# Patient Record
Sex: FEMALE | Race: WHITE | URBAN - METROPOLITAN AREA
[De-identification: names, ages, dates, MRNs, and addresses within clinical notes are randomized per-mention and may not be internally consistent; named-entity substitution may affect disease eponyms.]

---

## 2017-01-01 ENCOUNTER — HOSPITAL ENCOUNTER (OUTPATIENT)
Dept: SPEECH THERAPY | Facility: CLINIC | Age: 82
Setting detail: THERAPIES SERIES
End: 2017-03-31
Attending: INTERNAL MEDICINE
Payer: MEDICARE

## 2017-01-01 ENCOUNTER — OFFICE VISIT (OUTPATIENT)
Dept: INTERNAL MEDICINE | Facility: CLINIC | Age: 82
End: 2017-01-01
Payer: MEDICARE

## 2017-01-01 ENCOUNTER — MEDICAL CORRESPONDENCE (OUTPATIENT)
Dept: HEALTH INFORMATION MANAGEMENT | Facility: CLINIC | Age: 82
End: 2017-01-01

## 2017-01-01 ENCOUNTER — APPOINTMENT (OUTPATIENT)
Dept: GENERAL RADIOLOGY | Facility: CLINIC | Age: 82
DRG: 177 | End: 2017-01-01
Attending: EMERGENCY MEDICINE
Payer: MEDICARE

## 2017-01-01 ENCOUNTER — TELEPHONE (OUTPATIENT)
Dept: INTERNAL MEDICINE | Facility: CLINIC | Age: 82
End: 2017-01-01

## 2017-01-01 ENCOUNTER — RADIANT APPOINTMENT (OUTPATIENT)
Dept: GENERAL RADIOLOGY | Facility: CLINIC | Age: 82
End: 2017-01-01
Attending: INTERNAL MEDICINE
Payer: MEDICARE

## 2017-01-01 ENCOUNTER — APPOINTMENT (OUTPATIENT)
Dept: SPEECH THERAPY | Facility: CLINIC | Age: 82
DRG: 177 | End: 2017-01-01
Payer: MEDICARE

## 2017-01-01 ENCOUNTER — APPOINTMENT (OUTPATIENT)
Dept: PHYSICAL THERAPY | Facility: CLINIC | Age: 82
DRG: 177 | End: 2017-01-01
Payer: MEDICARE

## 2017-01-01 ENCOUNTER — TELEPHONE (OUTPATIENT)
Dept: NURSING | Facility: CLINIC | Age: 82
End: 2017-01-01

## 2017-01-01 ENCOUNTER — APPOINTMENT (OUTPATIENT)
Dept: OCCUPATIONAL THERAPY | Facility: CLINIC | Age: 82
DRG: 177 | End: 2017-01-01
Payer: MEDICARE

## 2017-01-01 ENCOUNTER — RADIANT APPOINTMENT (OUTPATIENT)
Dept: CARDIOLOGY | Facility: CLINIC | Age: 82
End: 2017-01-01
Attending: INTERNAL MEDICINE
Payer: MEDICARE

## 2017-01-01 ENCOUNTER — APPOINTMENT (OUTPATIENT)
Dept: OCCUPATIONAL THERAPY | Facility: CLINIC | Age: 82
DRG: 177 | End: 2017-01-01
Attending: INTERNAL MEDICINE
Payer: MEDICARE

## 2017-01-01 ENCOUNTER — HOSPITAL ENCOUNTER (OUTPATIENT)
Dept: GENERAL RADIOLOGY | Facility: CLINIC | Age: 82
Discharge: HOME | End: 2017-02-28
Attending: INTERNAL MEDICINE | Admitting: INTERNAL MEDICINE
Payer: MEDICARE

## 2017-01-01 ENCOUNTER — OFFICE VISIT (OUTPATIENT)
Dept: URGENT CARE | Facility: URGENT CARE | Age: 82
End: 2017-01-01
Payer: MEDICARE

## 2017-01-01 ENCOUNTER — HOSPITAL ENCOUNTER (INPATIENT)
Facility: CLINIC | Age: 82
LOS: 4 days | Discharge: HOSPICE | DRG: 177 | End: 2017-09-16
Attending: EMERGENCY MEDICINE | Admitting: INTERNAL MEDICINE
Payer: MEDICARE

## 2017-01-01 ENCOUNTER — TRANSFERRED RECORDS (OUTPATIENT)
Dept: HEALTH INFORMATION MANAGEMENT | Facility: CLINIC | Age: 82
End: 2017-01-01

## 2017-01-01 ENCOUNTER — APPOINTMENT (OUTPATIENT)
Dept: PHYSICAL THERAPY | Facility: CLINIC | Age: 82
DRG: 177 | End: 2017-01-01
Attending: INTERNAL MEDICINE
Payer: MEDICARE

## 2017-01-01 ENCOUNTER — HOSPITAL ENCOUNTER (OUTPATIENT)
Dept: SPEECH THERAPY | Facility: CLINIC | Age: 82
Setting detail: THERAPIES SERIES
End: 2017-03-14
Attending: INTERNAL MEDICINE
Payer: MEDICARE

## 2017-01-01 ENCOUNTER — HOSPITAL ENCOUNTER (OUTPATIENT)
Dept: SPEECH THERAPY | Facility: CLINIC | Age: 82
Setting detail: THERAPIES SERIES
End: 2017-02-09
Attending: INTERNAL MEDICINE
Payer: MEDICARE

## 2017-01-01 ENCOUNTER — HOSPITAL ENCOUNTER (OUTPATIENT)
Dept: SPEECH THERAPY | Facility: CLINIC | Age: 82
Setting detail: THERAPIES SERIES
End: 2017-02-28
Attending: INTERNAL MEDICINE
Payer: MEDICARE

## 2017-01-01 ENCOUNTER — NURSE TRIAGE (OUTPATIENT)
Dept: NURSING | Facility: CLINIC | Age: 82
End: 2017-01-01

## 2017-01-01 ENCOUNTER — APPOINTMENT (OUTPATIENT)
Dept: SPEECH THERAPY | Facility: CLINIC | Age: 82
DRG: 177 | End: 2017-01-01
Attending: INTERNAL MEDICINE
Payer: MEDICARE

## 2017-01-01 VITALS
HEART RATE: 71 BPM | DIASTOLIC BLOOD PRESSURE: 71 MMHG | OXYGEN SATURATION: 92 % | SYSTOLIC BLOOD PRESSURE: 169 MMHG | RESPIRATION RATE: 18 BRPM | BODY MASS INDEX: 32.64 KG/M2 | TEMPERATURE: 98 F | HEIGHT: 60 IN | WEIGHT: 166.23 LBS

## 2017-01-01 VITALS
DIASTOLIC BLOOD PRESSURE: 64 MMHG | WEIGHT: 168 LBS | TEMPERATURE: 98.3 F | OXYGEN SATURATION: 86 % | HEART RATE: 65 BPM | SYSTOLIC BLOOD PRESSURE: 132 MMHG | BODY MASS INDEX: 29.76 KG/M2

## 2017-01-01 VITALS
BODY MASS INDEX: 29.41 KG/M2 | WEIGHT: 166 LBS | HEIGHT: 63 IN | OXYGEN SATURATION: 94 % | DIASTOLIC BLOOD PRESSURE: 72 MMHG | TEMPERATURE: 98.7 F | SYSTOLIC BLOOD PRESSURE: 176 MMHG | HEART RATE: 74 BPM

## 2017-01-01 VITALS
SYSTOLIC BLOOD PRESSURE: 146 MMHG | TEMPERATURE: 97.5 F | HEIGHT: 63 IN | HEART RATE: 76 BPM | WEIGHT: 171 LBS | OXYGEN SATURATION: 95 % | BODY MASS INDEX: 30.3 KG/M2 | DIASTOLIC BLOOD PRESSURE: 72 MMHG | RESPIRATION RATE: 15 BRPM

## 2017-01-01 VITALS
DIASTOLIC BLOOD PRESSURE: 63 MMHG | SYSTOLIC BLOOD PRESSURE: 162 MMHG | HEART RATE: 68 BPM | BODY MASS INDEX: 29.05 KG/M2 | TEMPERATURE: 97 F | WEIGHT: 164 LBS

## 2017-01-01 VITALS
HEIGHT: 63 IN | OXYGEN SATURATION: 95 % | TEMPERATURE: 97.6 F | WEIGHT: 179 LBS | BODY MASS INDEX: 31.71 KG/M2 | DIASTOLIC BLOOD PRESSURE: 58 MMHG | SYSTOLIC BLOOD PRESSURE: 158 MMHG | HEART RATE: 58 BPM

## 2017-01-01 VITALS
HEART RATE: 58 BPM | SYSTOLIC BLOOD PRESSURE: 138 MMHG | BODY MASS INDEX: 29.76 KG/M2 | DIASTOLIC BLOOD PRESSURE: 90 MMHG | TEMPERATURE: 98.4 F | WEIGHT: 168 LBS | OXYGEN SATURATION: 91 %

## 2017-01-01 VITALS
TEMPERATURE: 97.7 F | BODY MASS INDEX: 29.51 KG/M2 | DIASTOLIC BLOOD PRESSURE: 70 MMHG | SYSTOLIC BLOOD PRESSURE: 156 MMHG | WEIGHT: 166.6 LBS | HEART RATE: 71 BPM | OXYGEN SATURATION: 93 %

## 2017-01-01 DIAGNOSIS — F41.9 ANXIETY: ICD-10-CM

## 2017-01-01 DIAGNOSIS — R06.00 DYSPNEA, UNSPECIFIED TYPE: ICD-10-CM

## 2017-01-01 DIAGNOSIS — J18.9 PNEUMONIA DUE TO INFECTIOUS ORGANISM, UNSPECIFIED LATERALITY, UNSPECIFIED PART OF LUNG: ICD-10-CM

## 2017-01-01 DIAGNOSIS — J18.9 PNEUMONIA OF RIGHT LOWER LOBE DUE TO INFECTIOUS ORGANISM: ICD-10-CM

## 2017-01-01 DIAGNOSIS — R53.83 FATIGUE, UNSPECIFIED TYPE: ICD-10-CM

## 2017-01-01 DIAGNOSIS — I10 ESSENTIAL HYPERTENSION, BENIGN: ICD-10-CM

## 2017-01-01 DIAGNOSIS — R05.9 COUGH: Primary | ICD-10-CM

## 2017-01-01 DIAGNOSIS — R05.9 COUGH: ICD-10-CM

## 2017-01-01 DIAGNOSIS — K92.2 GASTROINTESTINAL HEMORRHAGE, UNSPECIFIED GASTROINTESTINAL HEMORRHAGE TYPE: ICD-10-CM

## 2017-01-01 DIAGNOSIS — N89.8 VAGINAL ITCHING: Primary | ICD-10-CM

## 2017-01-01 DIAGNOSIS — R13.10 DYSPHAGIA, UNSPECIFIED TYPE: Primary | ICD-10-CM

## 2017-01-01 DIAGNOSIS — E11.40 TYPE 2 DIABETES MELLITUS WITH DIABETIC NEUROPATHY, WITHOUT LONG-TERM CURRENT USE OF INSULIN (H): ICD-10-CM

## 2017-01-01 DIAGNOSIS — R39.9 SYMPTOMS INVOLVING URINARY SYSTEM: Primary | ICD-10-CM

## 2017-01-01 DIAGNOSIS — Z51.5 HOSPICE CARE PATIENT: ICD-10-CM

## 2017-01-01 DIAGNOSIS — T78.40XA ALLERGIC REACTION, INITIAL ENCOUNTER: ICD-10-CM

## 2017-01-01 DIAGNOSIS — D47.2 MGUS (MONOCLONAL GAMMOPATHY OF UNKNOWN SIGNIFICANCE): ICD-10-CM

## 2017-01-01 DIAGNOSIS — J18.9 PNEUMONIA OF RIGHT LOWER LOBE DUE TO INFECTIOUS ORGANISM: Primary | ICD-10-CM

## 2017-01-01 DIAGNOSIS — D64.9 ANEMIA, UNSPECIFIED TYPE: ICD-10-CM

## 2017-01-01 DIAGNOSIS — E87.6 HYPOPOTASSEMIA: Primary | ICD-10-CM

## 2017-01-01 DIAGNOSIS — J18.9 PNEUMONIA OF RIGHT LUNG DUE TO INFECTIOUS ORGANISM, UNSPECIFIED PART OF LUNG: Primary | ICD-10-CM

## 2017-01-01 DIAGNOSIS — R09.02 HYPOXIA: ICD-10-CM

## 2017-01-01 DIAGNOSIS — J18.9 PNEUMONIA OF RIGHT LUNG DUE TO INFECTIOUS ORGANISM, UNSPECIFIED PART OF LUNG: ICD-10-CM

## 2017-01-01 DIAGNOSIS — R30.0 DYSURIA: Primary | ICD-10-CM

## 2017-01-01 DIAGNOSIS — R31.9 HEMATURIA: ICD-10-CM

## 2017-01-01 DIAGNOSIS — G62.9 NEUROPATHY: ICD-10-CM

## 2017-01-01 DIAGNOSIS — R39.9 SYMPTOMS INVOLVING URINARY SYSTEM: ICD-10-CM

## 2017-01-01 DIAGNOSIS — R60.9 EDEMA, UNSPECIFIED TYPE: ICD-10-CM

## 2017-01-01 DIAGNOSIS — N39.0 URINARY TRACT INFECTION, SITE UNSPECIFIED: ICD-10-CM

## 2017-01-01 DIAGNOSIS — R82.90 NONSPECIFIC FINDING ON EXAMINATION OF URINE: Primary | ICD-10-CM

## 2017-01-01 DIAGNOSIS — J18.9 PNEUMONIA DUE TO INFECTIOUS ORGANISM, UNSPECIFIED LATERALITY, UNSPECIFIED PART OF LUNG: Primary | ICD-10-CM

## 2017-01-01 DIAGNOSIS — G25.81 RESTLESS LEGS SYNDROME: ICD-10-CM

## 2017-01-01 DIAGNOSIS — H61.23 BILATERAL IMPACTED CERUMEN: Primary | ICD-10-CM

## 2017-01-01 DIAGNOSIS — L30.4 INTERTRIGO: ICD-10-CM

## 2017-01-01 DIAGNOSIS — R13.10 DYSPHAGIA, UNSPECIFIED TYPE: ICD-10-CM

## 2017-01-01 DIAGNOSIS — M17.12 PRIMARY LOCALIZED OSTEOARTHROSIS, LOWER LEG, LEFT: ICD-10-CM

## 2017-01-01 DIAGNOSIS — B37.2 CANDIDAL INTERTRIGO: Primary | ICD-10-CM

## 2017-01-01 DIAGNOSIS — I48.20 CHRONIC ATRIAL FIBRILLATION (H): ICD-10-CM

## 2017-01-01 LAB
ABO + RH BLD: NORMAL
ABO + RH BLD: NORMAL
ALBUMIN SERPL-MCNC: 2.8 G/DL (ref 3.4–5)
ALBUMIN UR-MCNC: 100 MG/DL
ALBUMIN UR-MCNC: 30 MG/DL
ALBUMIN UR-MCNC: ABNORMAL MG/DL
ALP SERPL-CCNC: 85 U/L (ref 40–150)
ALT SERPL W P-5'-P-CCNC: 19 U/L (ref 0–50)
ANION GAP SERPL CALCULATED.3IONS-SCNC: 10 MMOL/L (ref 3–14)
ANION GAP SERPL CALCULATED.3IONS-SCNC: 10 MMOL/L (ref 3–14)
ANION GAP SERPL CALCULATED.3IONS-SCNC: 7 MMOL/L (ref 3–14)
ANION GAP SERPL CALCULATED.3IONS-SCNC: 8 MMOL/L (ref 3–14)
ANION GAP SERPL CALCULATED.3IONS-SCNC: 9 MMOL/L (ref 3–14)
ANION GAP SERPL CALCULATED.3IONS-SCNC: 9 MMOL/L (ref 3–14)
APPEARANCE UR: ABNORMAL
APPEARANCE UR: ABNORMAL
APPEARANCE UR: CLEAR
AST SERPL W P-5'-P-CCNC: 12 U/L (ref 0–45)
BACTERIA #/AREA URNS HPF: ABNORMAL /HPF
BACTERIA SPEC CULT: ABNORMAL
BACTERIA SPEC CULT: NO GROWTH
BACTERIA SPEC CULT: NO GROWTH
BACTERIA SPEC CULT: NORMAL
BACTERIA SPEC CULT: NORMAL
BASOPHILS # BLD AUTO: 0.1 10E9/L (ref 0–0.2)
BASOPHILS NFR BLD AUTO: 0.5 %
BILIRUB SERPL-MCNC: 0.3 MG/DL (ref 0.2–1.3)
BILIRUB UR QL STRIP: ABNORMAL
BILIRUB UR QL STRIP: NEGATIVE
BILIRUB UR QL STRIP: NEGATIVE
BLD GP AB SCN SERPL QL: NORMAL
BLOOD BANK CMNT PATIENT-IMP: NORMAL
BUN SERPL-MCNC: 16 MG/DL (ref 7–30)
BUN SERPL-MCNC: 17 MG/DL (ref 7–30)
BUN SERPL-MCNC: 17 MG/DL (ref 7–30)
BUN SERPL-MCNC: 19 MG/DL (ref 7–30)
BUN SERPL-MCNC: 19 MG/DL (ref 7–30)
BUN SERPL-MCNC: 23 MG/DL (ref 7–30)
CALCIUM SERPL-MCNC: 8.1 MG/DL (ref 8.5–10.1)
CALCIUM SERPL-MCNC: 8.4 MG/DL (ref 8.5–10.1)
CALCIUM SERPL-MCNC: 8.5 MG/DL (ref 8.5–10.1)
CALCIUM SERPL-MCNC: 8.6 MG/DL (ref 8.5–10.1)
CALCIUM SERPL-MCNC: 8.6 MG/DL (ref 8.5–10.1)
CALCIUM SERPL-MCNC: 9.2 MG/DL (ref 8.5–10.1)
CHLORIDE SERPL-SCNC: 100 MMOL/L (ref 94–109)
CHLORIDE SERPL-SCNC: 101 MMOL/L (ref 94–109)
CHLORIDE SERPL-SCNC: 102 MMOL/L (ref 94–109)
CHLORIDE SERPL-SCNC: 103 MMOL/L (ref 94–109)
CHLORIDE SERPL-SCNC: 105 MMOL/L (ref 94–109)
CHLORIDE SERPL-SCNC: 99 MMOL/L (ref 94–109)
CO2 SERPL-SCNC: 23 MMOL/L (ref 20–32)
CO2 SERPL-SCNC: 26 MMOL/L (ref 20–32)
CO2 SERPL-SCNC: 26 MMOL/L (ref 20–32)
CO2 SERPL-SCNC: 27 MMOL/L (ref 20–32)
COLOR UR AUTO: ABNORMAL
COLOR UR AUTO: YELLOW
COLOR UR AUTO: YELLOW
CREAT SERPL-MCNC: 1.04 MG/DL (ref 0.52–1.04)
CREAT SERPL-MCNC: 1.08 MG/DL (ref 0.52–1.04)
CREAT SERPL-MCNC: 1.13 MG/DL (ref 0.52–1.04)
CREAT SERPL-MCNC: 1.16 MG/DL (ref 0.52–1.04)
CREAT SERPL-MCNC: 1.17 MG/DL (ref 0.52–1.04)
CREAT SERPL-MCNC: 1.2 MG/DL (ref 0.52–1.04)
DIFFERENTIAL METHOD BLD: ABNORMAL
DIGOXIN SERPL-MCNC: 0.9 UG/L (ref 0.5–2)
DIGOXIN SERPL-MCNC: 1.5 UG/L (ref 0.5–2)
EOSINOPHIL # BLD AUTO: 0.1 10E9/L (ref 0–0.7)
EOSINOPHIL NFR BLD AUTO: 1 %
ERYTHROCYTE [DISTWIDTH] IN BLOOD BY AUTOMATED COUNT: 14.4 % (ref 10–15)
ERYTHROCYTE [DISTWIDTH] IN BLOOD BY AUTOMATED COUNT: 14.6 % (ref 10–15)
ERYTHROCYTE [DISTWIDTH] IN BLOOD BY AUTOMATED COUNT: 14.6 % (ref 10–15)
ERYTHROCYTE [DISTWIDTH] IN BLOOD BY AUTOMATED COUNT: 14.8 % (ref 10–15)
ERYTHROCYTE [DISTWIDTH] IN BLOOD BY AUTOMATED COUNT: 15 % (ref 10–15)
ERYTHROCYTE [DISTWIDTH] IN BLOOD BY AUTOMATED COUNT: 15.1 % (ref 10–15)
ERYTHROCYTE [DISTWIDTH] IN BLOOD BY AUTOMATED COUNT: 15.2 % (ref 10–15)
FERRITIN SERPL-MCNC: 10 NG/ML (ref 8–252)
FOLATE SERPL-MCNC: 35.1 NG/ML
FOLATE SERPL-MCNC: NORMAL NG/ML
GFR SERPL CREATININE-BSD FRML MDRD: 42 ML/MIN/1.7M2
GFR SERPL CREATININE-BSD FRML MDRD: 43 ML/MIN/1.7M2
GFR SERPL CREATININE-BSD FRML MDRD: 43 ML/MIN/1.7M2
GFR SERPL CREATININE-BSD FRML MDRD: 45 ML/MIN/1.7M2
GFR SERPL CREATININE-BSD FRML MDRD: 47 ML/MIN/1.7M2
GFR SERPL CREATININE-BSD FRML MDRD: 49 ML/MIN/1.7M2
GLUCOSE BLDC GLUCOMTR-MCNC: 103 MG/DL (ref 70–99)
GLUCOSE BLDC GLUCOMTR-MCNC: 127 MG/DL (ref 70–99)
GLUCOSE BLDC GLUCOMTR-MCNC: 128 MG/DL (ref 70–99)
GLUCOSE BLDC GLUCOMTR-MCNC: 132 MG/DL (ref 70–99)
GLUCOSE BLDC GLUCOMTR-MCNC: 138 MG/DL (ref 70–99)
GLUCOSE BLDC GLUCOMTR-MCNC: 142 MG/DL (ref 70–99)
GLUCOSE BLDC GLUCOMTR-MCNC: 148 MG/DL (ref 70–99)
GLUCOSE BLDC GLUCOMTR-MCNC: 148 MG/DL (ref 70–99)
GLUCOSE BLDC GLUCOMTR-MCNC: 153 MG/DL (ref 70–99)
GLUCOSE BLDC GLUCOMTR-MCNC: 153 MG/DL (ref 70–99)
GLUCOSE BLDC GLUCOMTR-MCNC: 166 MG/DL (ref 70–99)
GLUCOSE BLDC GLUCOMTR-MCNC: 167 MG/DL (ref 70–99)
GLUCOSE BLDC GLUCOMTR-MCNC: 169 MG/DL (ref 70–99)
GLUCOSE BLDC GLUCOMTR-MCNC: 173 MG/DL (ref 70–99)
GLUCOSE BLDC GLUCOMTR-MCNC: 175 MG/DL (ref 70–99)
GLUCOSE BLDC GLUCOMTR-MCNC: 200 MG/DL (ref 70–99)
GLUCOSE BLDC GLUCOMTR-MCNC: 222 MG/DL (ref 70–99)
GLUCOSE BLDC GLUCOMTR-MCNC: 228 MG/DL (ref 70–99)
GLUCOSE BLDC GLUCOMTR-MCNC: 228 MG/DL (ref 70–99)
GLUCOSE SERPL-MCNC: 153 MG/DL (ref 70–99)
GLUCOSE SERPL-MCNC: 161 MG/DL (ref 70–99)
GLUCOSE SERPL-MCNC: 210 MG/DL (ref 70–99)
GLUCOSE SERPL-MCNC: 212 MG/DL (ref 70–99)
GLUCOSE SERPL-MCNC: 240 MG/DL (ref 70–99)
GLUCOSE SERPL-MCNC: 327 MG/DL (ref 70–99)
GLUCOSE UR STRIP-MCNC: 100 MG/DL
GLUCOSE UR STRIP-MCNC: >=1000 MG/DL
GLUCOSE UR STRIP-MCNC: NEGATIVE MG/DL
HBA1C MFR BLD: 9.2 % (ref 4.3–6)
HBA1C MFR BLD: 9.4 % (ref 4.3–6)
HBA1C MFR BLD: 9.5 % (ref 4.3–6)
HCT VFR BLD AUTO: 24.6 % (ref 35–47)
HCT VFR BLD AUTO: 25.2 % (ref 35–47)
HCT VFR BLD AUTO: 25.2 % (ref 35–47)
HCT VFR BLD AUTO: 26.7 % (ref 35–47)
HCT VFR BLD AUTO: 28.8 % (ref 35–47)
HCT VFR BLD AUTO: 35 % (ref 35–47)
HCT VFR BLD AUTO: 36.1 % (ref 35–47)
HGB BLD-MCNC: 10.7 G/DL (ref 11.7–15.7)
HGB BLD-MCNC: 11.1 G/DL (ref 11.7–15.7)
HGB BLD-MCNC: 7.3 G/DL (ref 11.7–15.7)
HGB BLD-MCNC: 7.6 G/DL (ref 11.7–15.7)
HGB BLD-MCNC: 7.6 G/DL (ref 11.7–15.7)
HGB BLD-MCNC: 8.2 G/DL (ref 11.7–15.7)
HGB BLD-MCNC: 8.4 G/DL (ref 11.7–15.7)
HGB UR QL STRIP: ABNORMAL
IMM GRANULOCYTES # BLD: 0.1 10E9/L (ref 0–0.4)
IMM GRANULOCYTES NFR BLD: 0.6 %
INR PPP: 1.37 (ref 0.86–1.14)
INTERPRETATION ECG - MUSE: NORMAL
IRON SATN MFR SERPL: 3 % (ref 15–46)
IRON SERPL-MCNC: 12 UG/DL (ref 35–180)
KETONES UR STRIP-MCNC: NEGATIVE MG/DL
LACTATE BLD-SCNC: 1.7 MMOL/L (ref 0.7–2)
LACTATE BLD-SCNC: 2.2 MMOL/L (ref 0.7–2)
LEUKOCYTE ESTERASE UR QL STRIP: ABNORMAL
LEUKOCYTE ESTERASE UR QL STRIP: ABNORMAL
LEUKOCYTE ESTERASE UR QL STRIP: NEGATIVE
LYMPHOCYTES # BLD AUTO: 1.4 10E9/L (ref 0.8–5.3)
LYMPHOCYTES NFR BLD AUTO: 12.1 %
Lab: NORMAL
Lab: NORMAL
MCH RBC QN AUTO: 23.3 PG (ref 26.5–33)
MCH RBC QN AUTO: 23.6 PG (ref 26.5–33)
MCH RBC QN AUTO: 23.8 PG (ref 26.5–33)
MCH RBC QN AUTO: 24 PG (ref 26.5–33)
MCH RBC QN AUTO: 24.3 PG (ref 26.5–33)
MCH RBC QN AUTO: 24.8 PG (ref 26.5–33)
MCH RBC QN AUTO: 24.9 PG (ref 26.5–33)
MCHC RBC AUTO-ENTMCNC: 29.2 G/DL (ref 31.5–36.5)
MCHC RBC AUTO-ENTMCNC: 29.7 G/DL (ref 31.5–36.5)
MCHC RBC AUTO-ENTMCNC: 30.2 G/DL (ref 31.5–36.5)
MCHC RBC AUTO-ENTMCNC: 30.2 G/DL (ref 31.5–36.5)
MCHC RBC AUTO-ENTMCNC: 30.6 G/DL (ref 31.5–36.5)
MCHC RBC AUTO-ENTMCNC: 30.7 G/DL (ref 31.5–36.5)
MCHC RBC AUTO-ENTMCNC: 30.7 G/DL (ref 31.5–36.5)
MCV RBC AUTO: 78 FL (ref 78–100)
MCV RBC AUTO: 78 FL (ref 78–100)
MCV RBC AUTO: 79 FL (ref 78–100)
MCV RBC AUTO: 79 FL (ref 78–100)
MCV RBC AUTO: 81 FL (ref 78–100)
MCV RBC AUTO: 82 FL (ref 78–100)
MCV RBC AUTO: 83 FL (ref 78–100)
MICRO REPORT STATUS: ABNORMAL
MICRO REPORT STATUS: NORMAL
MICRO REPORT STATUS: NORMAL
MICROORGANISM SPEC CULT: ABNORMAL
MONOCYTES # BLD AUTO: 1.2 10E9/L (ref 0–1.3)
MONOCYTES NFR BLD AUTO: 10.7 %
NEUTROPHILS # BLD AUTO: 8.7 10E9/L (ref 1.6–8.3)
NEUTROPHILS NFR BLD AUTO: 75.1 %
NITRATE UR QL: NEGATIVE
NITRATE UR QL: NEGATIVE
NITRATE UR QL: POSITIVE
NON-SQ EPI CELLS #/AREA URNS LPF: ABNORMAL /LPF
NRBC # BLD AUTO: 0 10*3/UL
NRBC BLD AUTO-RTO: 0 /100
NT-PROBNP SERPL-MCNC: 1781 PG/ML (ref 0–1800)
PH UR STRIP: 5.5 PH (ref 5–7)
PLATELET # BLD AUTO: 384 10E9/L (ref 150–450)
PLATELET # BLD AUTO: 385 10E9/L (ref 150–450)
PLATELET # BLD AUTO: 386 10E9/L (ref 150–450)
PLATELET # BLD AUTO: 388 10E9/L (ref 150–450)
PLATELET # BLD AUTO: 396 10E9/L (ref 150–450)
PLATELET # BLD AUTO: 405 10E9/L (ref 150–450)
PLATELET # BLD AUTO: 462 10E9/L (ref 150–450)
POTASSIUM SERPL-SCNC: 4.1 MMOL/L (ref 3.4–5.3)
POTASSIUM SERPL-SCNC: 4.1 MMOL/L (ref 3.4–5.3)
POTASSIUM SERPL-SCNC: 4.3 MMOL/L (ref 3.4–5.3)
POTASSIUM SERPL-SCNC: 4.3 MMOL/L (ref 3.4–5.3)
POTASSIUM SERPL-SCNC: 4.5 MMOL/L (ref 3.4–5.3)
POTASSIUM SERPL-SCNC: 5.3 MMOL/L (ref 3.4–5.3)
PROT SERPL-MCNC: 7.5 G/DL (ref 6.8–8.8)
RBC # BLD AUTO: 3.13 10E12/L (ref 3.8–5.2)
RBC # BLD AUTO: 3.19 10E12/L (ref 3.8–5.2)
RBC # BLD AUTO: 3.22 10E12/L (ref 3.8–5.2)
RBC # BLD AUTO: 3.42 10E12/L (ref 3.8–5.2)
RBC # BLD AUTO: 3.46 10E12/L (ref 3.8–5.2)
RBC # BLD AUTO: 4.29 10E12/L (ref 3.8–5.2)
RBC # BLD AUTO: 4.48 10E12/L (ref 3.8–5.2)
RBC #/AREA URNS AUTO: ABNORMAL /HPF
RBC #/AREA URNS AUTO: ABNORMAL /HPF (ref 0–2)
RBC #/AREA URNS AUTO: ABNORMAL /HPF (ref 0–2)
SODIUM SERPL-SCNC: 133 MMOL/L (ref 133–144)
SODIUM SERPL-SCNC: 134 MMOL/L (ref 133–144)
SODIUM SERPL-SCNC: 134 MMOL/L (ref 133–144)
SODIUM SERPL-SCNC: 135 MMOL/L (ref 133–144)
SODIUM SERPL-SCNC: 137 MMOL/L (ref 133–144)
SODIUM SERPL-SCNC: 138 MMOL/L (ref 133–144)
SOURCE: ABNORMAL
SP GR UR STRIP: 1.01 (ref 1–1.03)
SP GR UR STRIP: 1.02 (ref 1–1.03)
SP GR UR STRIP: <=1.005 (ref 1–1.03)
SPECIMEN EXP DATE BLD: NORMAL
SPECIMEN SOURCE: ABNORMAL
SPECIMEN SOURCE: NORMAL
TIBC SERPL-MCNC: 451 UG/DL (ref 240–430)
TROPONIN I SERPL-MCNC: <0.015 UG/L (ref 0–0.04)
URN SPEC COLLECT METH UR: ABNORMAL
URN SPEC COLLECT METH UR: ABNORMAL
UROBILINOGEN UR STRIP-ACNC: 0.2 EU/DL (ref 0.2–1)
VIT B12 SERPL-MCNC: 1077 PG/ML (ref 193–986)
WBC # BLD AUTO: 11.6 10E9/L (ref 4–11)
WBC # BLD AUTO: 11.7 10E9/L (ref 4–11)
WBC # BLD AUTO: 11.7 10E9/L (ref 4–11)
WBC # BLD AUTO: 11.9 10E9/L (ref 4–11)
WBC # BLD AUTO: 12.6 10E9/L (ref 4–11)
WBC # BLD AUTO: 14.9 10E9/L (ref 4–11)
WBC # BLD AUTO: 15.5 10E9/L (ref 4–11)
WBC #/AREA URNS AUTO: >100 /HPF
WBC #/AREA URNS AUTO: ABNORMAL /HPF (ref 0–2)
WBC #/AREA URNS AUTO: ABNORMAL /HPF (ref 0–2)
WET PREP SPEC: NORMAL

## 2017-01-01 PROCEDURE — 83605 ASSAY OF LACTIC ACID: CPT | Performed by: INTERNAL MEDICINE

## 2017-01-01 PROCEDURE — 25000128 H RX IP 250 OP 636: Performed by: EMERGENCY MEDICINE

## 2017-01-01 PROCEDURE — 12000000 ZZH R&B MED SURG/OB

## 2017-01-01 PROCEDURE — 25000132 ZZH RX MED GY IP 250 OP 250 PS 637: Mod: GY | Performed by: INTERNAL MEDICINE

## 2017-01-01 PROCEDURE — 80048 BASIC METABOLIC PNL TOTAL CA: CPT | Performed by: INTERNAL MEDICINE

## 2017-01-01 PROCEDURE — 85027 COMPLETE CBC AUTOMATED: CPT | Performed by: INTERNAL MEDICINE

## 2017-01-01 PROCEDURE — 82728 ASSAY OF FERRITIN: CPT | Performed by: EMERGENCY MEDICINE

## 2017-01-01 PROCEDURE — 92526 ORAL FUNCTION THERAPY: CPT | Mod: GN

## 2017-01-01 PROCEDURE — 83036 HEMOGLOBIN GLYCOSYLATED A1C: CPT | Performed by: INTERNAL MEDICINE

## 2017-01-01 PROCEDURE — 86901 BLOOD TYPING SEROLOGIC RH(D): CPT | Performed by: EMERGENCY MEDICINE

## 2017-01-01 PROCEDURE — 87186 SC STD MICRODIL/AGAR DIL: CPT | Performed by: INTERNAL MEDICINE

## 2017-01-01 PROCEDURE — 99223 1ST HOSP IP/OBS HIGH 75: CPT | Mod: AI | Performed by: INTERNAL MEDICINE

## 2017-01-01 PROCEDURE — 00000146 ZZHCL STATISTIC GLUCOSE BY METER IP

## 2017-01-01 PROCEDURE — 83550 IRON BINDING TEST: CPT | Performed by: EMERGENCY MEDICINE

## 2017-01-01 PROCEDURE — 80162 ASSAY OF DIGOXIN TOTAL: CPT | Performed by: INTERNAL MEDICINE

## 2017-01-01 PROCEDURE — 25000128 H RX IP 250 OP 636: Performed by: INTERNAL MEDICINE

## 2017-01-01 PROCEDURE — 40000211 ZZHC STATISTIC SLP  DEPARTMENT VISIT: Performed by: SPEECH-LANGUAGE PATHOLOGIST

## 2017-01-01 PROCEDURE — 36415 COLL VENOUS BLD VENIPUNCTURE: CPT | Performed by: INTERNAL MEDICINE

## 2017-01-01 PROCEDURE — 99233 SBSQ HOSP IP/OBS HIGH 50: CPT | Performed by: INTERNAL MEDICINE

## 2017-01-01 PROCEDURE — 96365 THER/PROPH/DIAG IV INF INIT: CPT

## 2017-01-01 PROCEDURE — 87088 URINE BACTERIA CULTURE: CPT | Performed by: INTERNAL MEDICINE

## 2017-01-01 PROCEDURE — 92526 ORAL FUNCTION THERAPY: CPT | Mod: GN | Performed by: SPEECH-LANGUAGE PATHOLOGIST

## 2017-01-01 PROCEDURE — 83036 HEMOGLOBIN GLYCOSYLATED A1C: CPT | Performed by: EMERGENCY MEDICINE

## 2017-01-01 PROCEDURE — 87040 BLOOD CULTURE FOR BACTERIA: CPT | Performed by: EMERGENCY MEDICINE

## 2017-01-01 PROCEDURE — 99214 OFFICE O/P EST MOD 30 MIN: CPT | Performed by: INTERNAL MEDICINE

## 2017-01-01 PROCEDURE — 93306 TTE W/DOPPLER COMPLETE: CPT | Mod: 26 | Performed by: INTERNAL MEDICINE

## 2017-01-01 PROCEDURE — 97535 SELF CARE MNGMENT TRAINING: CPT | Mod: GO

## 2017-01-01 PROCEDURE — 96375 TX/PRO/DX INJ NEW DRUG ADDON: CPT

## 2017-01-01 PROCEDURE — 92610 EVALUATE SWALLOWING FUNCTION: CPT | Mod: GN | Performed by: SPEECH-LANGUAGE PATHOLOGIST

## 2017-01-01 PROCEDURE — 25000131 ZZH RX MED GY IP 250 OP 636 PS 637: Mod: GY | Performed by: INTERNAL MEDICINE

## 2017-01-01 PROCEDURE — G8997 SWALLOW GOAL STATUS: HCPCS | Mod: GN,CJ | Performed by: SPEECH-LANGUAGE PATHOLOGIST

## 2017-01-01 PROCEDURE — 87086 URINE CULTURE/COLONY COUNT: CPT | Performed by: INTERNAL MEDICINE

## 2017-01-01 PROCEDURE — G8997 SWALLOW GOAL STATUS: HCPCS | Mod: GN,CI | Performed by: SPEECH-LANGUAGE PATHOLOGIST

## 2017-01-01 PROCEDURE — 92611 MOTION FLUOROSCOPY/SWALLOW: CPT | Mod: GN | Performed by: SPEECH-LANGUAGE PATHOLOGIST

## 2017-01-01 PROCEDURE — 86850 RBC ANTIBODY SCREEN: CPT | Performed by: EMERGENCY MEDICINE

## 2017-01-01 PROCEDURE — 40000193 ZZH STATISTIC PT WARD VISIT: Performed by: PHYSICAL THERAPIST

## 2017-01-01 PROCEDURE — 36415 COLL VENOUS BLD VENIPUNCTURE: CPT | Performed by: EMERGENCY MEDICINE

## 2017-01-01 PROCEDURE — 99285 EMERGENCY DEPT VISIT HI MDM: CPT | Mod: 25

## 2017-01-01 PROCEDURE — 71020 XR CHEST 2 VW: CPT

## 2017-01-01 PROCEDURE — 97165 OT EVAL LOW COMPLEX 30 MIN: CPT | Mod: GO

## 2017-01-01 PROCEDURE — 40000225 ZZH STATISTIC SLP WARD VISIT

## 2017-01-01 PROCEDURE — G8996 SWALLOW CURRENT STATUS: HCPCS | Mod: GN,CI | Performed by: SPEECH-LANGUAGE PATHOLOGIST

## 2017-01-01 PROCEDURE — 83540 ASSAY OF IRON: CPT | Performed by: EMERGENCY MEDICINE

## 2017-01-01 PROCEDURE — A9270 NON-COVERED ITEM OR SERVICE: HCPCS | Mod: GY | Performed by: INTERNAL MEDICINE

## 2017-01-01 PROCEDURE — 40000225 ZZH STATISTIC SLP WARD VISIT: Performed by: SPEECH-LANGUAGE PATHOLOGIST

## 2017-01-01 PROCEDURE — 93005 ELECTROCARDIOGRAM TRACING: CPT

## 2017-01-01 PROCEDURE — 97535 SELF CARE MNGMENT TRAINING: CPT | Mod: GO | Performed by: OCCUPATIONAL THERAPIST

## 2017-01-01 PROCEDURE — G8996 SWALLOW CURRENT STATUS: HCPCS | Mod: GN,CJ | Performed by: SPEECH-LANGUAGE PATHOLOGIST

## 2017-01-01 PROCEDURE — 99214 OFFICE O/P EST MOD 30 MIN: CPT | Performed by: PHYSICIAN ASSISTANT

## 2017-01-01 PROCEDURE — 83605 ASSAY OF LACTIC ACID: CPT | Performed by: EMERGENCY MEDICINE

## 2017-01-01 PROCEDURE — 82607 VITAMIN B-12: CPT | Performed by: EMERGENCY MEDICINE

## 2017-01-01 PROCEDURE — 97116 GAIT TRAINING THERAPY: CPT | Mod: GP | Performed by: PHYSICAL THERAPIST

## 2017-01-01 PROCEDURE — 97530 THERAPEUTIC ACTIVITIES: CPT | Mod: GP | Performed by: PHYSICAL THERAPIST

## 2017-01-01 PROCEDURE — 99213 OFFICE O/P EST LOW 20 MIN: CPT | Performed by: PHYSICIAN ASSISTANT

## 2017-01-01 PROCEDURE — 80162 ASSAY OF DIGOXIN TOTAL: CPT | Performed by: EMERGENCY MEDICINE

## 2017-01-01 PROCEDURE — 93306 TTE W/DOPPLER COMPLETE: CPT | Mod: TC

## 2017-01-01 PROCEDURE — 81001 URINALYSIS AUTO W/SCOPE: CPT | Performed by: PHYSICIAN ASSISTANT

## 2017-01-01 PROCEDURE — 99213 OFFICE O/P EST LOW 20 MIN: CPT | Performed by: FAMILY MEDICINE

## 2017-01-01 PROCEDURE — 85025 COMPLETE CBC W/AUTO DIFF WBC: CPT | Performed by: EMERGENCY MEDICINE

## 2017-01-01 PROCEDURE — 97110 THERAPEUTIC EXERCISES: CPT | Mod: GP | Performed by: PHYSICAL THERAPIST

## 2017-01-01 PROCEDURE — 99239 HOSP IP/OBS DSCHRG MGMT >30: CPT | Mod: GV | Performed by: INTERNAL MEDICINE

## 2017-01-01 PROCEDURE — 87210 SMEAR WET MOUNT SALINE/INK: CPT | Performed by: PHYSICIAN ASSISTANT

## 2017-01-01 PROCEDURE — 82746 ASSAY OF FOLIC ACID SERUM: CPT | Performed by: EMERGENCY MEDICINE

## 2017-01-01 PROCEDURE — 81001 URINALYSIS AUTO W/SCOPE: CPT | Performed by: INTERNAL MEDICINE

## 2017-01-01 PROCEDURE — 40000133 ZZH STATISTIC OT WARD VISIT: Performed by: OCCUPATIONAL THERAPIST

## 2017-01-01 PROCEDURE — 82746 ASSAY OF FOLIC ACID SERUM: CPT | Performed by: INTERNAL MEDICINE

## 2017-01-01 PROCEDURE — 40000133 ZZH STATISTIC OT WARD VISIT

## 2017-01-01 PROCEDURE — 80053 COMPREHEN METABOLIC PANEL: CPT | Performed by: EMERGENCY MEDICINE

## 2017-01-01 PROCEDURE — 97161 PT EVAL LOW COMPLEX 20 MIN: CPT | Mod: GP | Performed by: PHYSICAL THERAPIST

## 2017-01-01 PROCEDURE — 86900 BLOOD TYPING SEROLOGIC ABO: CPT | Performed by: EMERGENCY MEDICINE

## 2017-01-01 PROCEDURE — 87086 URINE CULTURE/COLONY COUNT: CPT | Performed by: PHYSICIAN ASSISTANT

## 2017-01-01 PROCEDURE — 96367 TX/PROPH/DG ADDL SEQ IV INF: CPT

## 2017-01-01 PROCEDURE — 25000125 ZZHC RX 250: Performed by: EMERGENCY MEDICINE

## 2017-01-01 PROCEDURE — 83880 ASSAY OF NATRIURETIC PEPTIDE: CPT | Performed by: EMERGENCY MEDICINE

## 2017-01-01 PROCEDURE — 92507 TX SP LANG VOICE COMM INDIV: CPT | Mod: GN | Performed by: SPEECH-LANGUAGE PATHOLOGIST

## 2017-01-01 PROCEDURE — 84484 ASSAY OF TROPONIN QUANT: CPT | Performed by: EMERGENCY MEDICINE

## 2017-01-01 PROCEDURE — 85610 PROTHROMBIN TIME: CPT | Performed by: EMERGENCY MEDICINE

## 2017-01-01 RX ORDER — SODIUM CHLORIDE 9 MG/ML
INJECTION, SOLUTION INTRAVENOUS CONTINUOUS
Status: ACTIVE | OUTPATIENT
Start: 2017-01-01 | End: 2017-01-01

## 2017-01-01 RX ORDER — ONDANSETRON 4 MG/1
4 TABLET, ORALLY DISINTEGRATING ORAL EVERY 6 HOURS PRN
Status: DISCONTINUED | OUTPATIENT
Start: 2017-01-01 | End: 2017-01-01 | Stop reason: HOSPADM

## 2017-01-01 RX ORDER — SENNOSIDES 8.6 MG
1-2 TABLET ORAL 2 TIMES DAILY
Qty: 100 TABLET | Refills: 1 | Status: SHIPPED | OUTPATIENT
Start: 2017-01-01

## 2017-01-01 RX ORDER — GABAPENTIN 300 MG/1
300 CAPSULE ORAL AT BEDTIME
Qty: 90 CAPSULE | Refills: 3 | Status: SHIPPED | OUTPATIENT
Start: 2017-01-01

## 2017-01-01 RX ORDER — LIDOCAINE 40 MG/G
CREAM TOPICAL
Status: DISCONTINUED | OUTPATIENT
Start: 2017-01-01 | End: 2017-01-01 | Stop reason: HOSPADM

## 2017-01-01 RX ORDER — AMOXICILLIN AND CLAVULANATE POTASSIUM 500; 125 MG/1; MG/1
1 TABLET, FILM COATED ORAL 2 TIMES DAILY
Qty: 20 TABLET | Refills: 0 | Status: SHIPPED | OUTPATIENT
Start: 2017-01-01 | End: 2017-01-01

## 2017-01-01 RX ORDER — ATROPINE SULFATE 10 MG/ML
2-4 SOLUTION/ DROPS OPHTHALMIC
Qty: 5 ML | Refills: 11 | Status: SHIPPED | OUTPATIENT
Start: 2017-01-01

## 2017-01-01 RX ORDER — AMOXICILLIN 250 MG
1-2 CAPSULE ORAL 2 TIMES DAILY PRN
Status: DISCONTINUED | OUTPATIENT
Start: 2017-01-01 | End: 2017-01-01 | Stop reason: HOSPADM

## 2017-01-01 RX ORDER — LEVOFLOXACIN 250 MG/1
250 TABLET, FILM COATED ORAL DAILY
Qty: 10 TABLET | Refills: 0 | Status: SHIPPED | OUTPATIENT
Start: 2017-01-01 | End: 2017-01-01

## 2017-01-01 RX ORDER — TRAMADOL HYDROCHLORIDE 50 MG/1
50 TABLET ORAL EVERY 6 HOURS PRN
Qty: 30 TABLET | Refills: 0 | Status: ON HOLD | OUTPATIENT
Start: 2017-01-01 | End: 2017-01-01

## 2017-01-01 RX ORDER — POTASSIUM CHLORIDE 7.45 MG/ML
10 INJECTION INTRAVENOUS
Status: DISCONTINUED | OUTPATIENT
Start: 2017-01-01 | End: 2017-01-01

## 2017-01-01 RX ORDER — HALOPERIDOL 0.5 MG/1
.5-1 TABLET ORAL EVERY 6 HOURS PRN
Qty: 30 TABLET | Refills: 11 | Status: SHIPPED | OUTPATIENT
Start: 2017-01-01

## 2017-01-01 RX ORDER — POTASSIUM CHLORIDE 1.5 G/1.58G
20-40 POWDER, FOR SOLUTION ORAL
Status: DISCONTINUED | OUTPATIENT
Start: 2017-01-01 | End: 2017-01-01

## 2017-01-01 RX ORDER — POTASSIUM CHLORIDE 1500 MG/1
20-40 TABLET, EXTENDED RELEASE ORAL
Status: DISCONTINUED | OUTPATIENT
Start: 2017-01-01 | End: 2017-01-01

## 2017-01-01 RX ORDER — NICOTINE POLACRILEX 4 MG
15-30 LOZENGE BUCCAL
Status: DISCONTINUED | OUTPATIENT
Start: 2017-01-01 | End: 2017-01-01 | Stop reason: HOSPADM

## 2017-01-01 RX ORDER — CEFTRIAXONE 2 G/1
2 INJECTION, POWDER, FOR SOLUTION INTRAMUSCULAR; INTRAVENOUS EVERY 24 HOURS
Status: DISCONTINUED | OUTPATIENT
Start: 2017-01-01 | End: 2017-01-01

## 2017-01-01 RX ORDER — CEFUROXIME AXETIL 250 MG/1
250 TABLET ORAL 2 TIMES DAILY
Qty: 20 TABLET | Refills: 0 | Status: SHIPPED | OUTPATIENT
Start: 2017-01-01 | End: 2017-01-01

## 2017-01-01 RX ORDER — ACETAMINOPHEN 650 MG/1
650 SUPPOSITORY RECTAL EVERY 4 HOURS PRN
Qty: 2 SUPPOSITORY | Refills: 11 | Status: SHIPPED | OUTPATIENT
Start: 2017-01-01

## 2017-01-01 RX ORDER — GABAPENTIN 300 MG/1
300 CAPSULE ORAL AT BEDTIME
Status: DISCONTINUED | OUTPATIENT
Start: 2017-01-01 | End: 2017-01-01 | Stop reason: HOSPADM

## 2017-01-01 RX ORDER — DILTIAZEM HYDROCHLORIDE 240 MG/1
240 CAPSULE, EXTENDED RELEASE ORAL DAILY
Qty: 90 CAPSULE | Refills: 3 | Status: SHIPPED | OUTPATIENT
Start: 2017-01-01

## 2017-01-01 RX ORDER — LORAZEPAM 0.5 MG/1
.25-.5 TABLET ORAL EVERY 4 HOURS PRN
Qty: 30 TABLET | Refills: 5 | Status: SHIPPED | OUTPATIENT
Start: 2017-01-01

## 2017-01-01 RX ORDER — TRAMADOL HYDROCHLORIDE 50 MG/1
50 TABLET ORAL EVERY 6 HOURS PRN
Status: DISCONTINUED | OUTPATIENT
Start: 2017-01-01 | End: 2017-01-01 | Stop reason: HOSPADM

## 2017-01-01 RX ORDER — POTASSIUM CHLORIDE 750 MG/1
10 TABLET, EXTENDED RELEASE ORAL DAILY
Qty: 90 TABLET | Status: ON HOLD | OUTPATIENT
Start: 2017-01-01 | End: 2017-01-01

## 2017-01-01 RX ORDER — ONDANSETRON 2 MG/ML
4 INJECTION INTRAMUSCULAR; INTRAVENOUS EVERY 6 HOURS PRN
Status: DISCONTINUED | OUTPATIENT
Start: 2017-01-01 | End: 2017-01-01 | Stop reason: HOSPADM

## 2017-01-01 RX ORDER — DEXTROSE MONOHYDRATE 25 G/50ML
25-50 INJECTION, SOLUTION INTRAVENOUS
Status: DISCONTINUED | OUTPATIENT
Start: 2017-01-01 | End: 2017-01-01 | Stop reason: HOSPADM

## 2017-01-01 RX ORDER — TERCONAZOLE 0.4 %
1 CREAM WITH APPLICATOR VAGINAL AT BEDTIME
Qty: 45 G | Refills: 0 | Status: CANCELLED | OUTPATIENT
Start: 2017-01-01 | End: 2017-01-01

## 2017-01-01 RX ORDER — POTASSIUM CHLORIDE 29.8 MG/ML
20 INJECTION INTRAVENOUS
Status: DISCONTINUED | OUTPATIENT
Start: 2017-01-01 | End: 2017-01-01

## 2017-01-01 RX ORDER — NYSTATIN 100000 [USP'U]/G
POWDER TOPICAL 3 TIMES DAILY PRN
Qty: 60 G | Refills: 1 | Status: SHIPPED | OUTPATIENT
Start: 2017-01-01 | End: 2017-01-01

## 2017-01-01 RX ORDER — FUROSEMIDE 10 MG/ML
40 INJECTION INTRAMUSCULAR; INTRAVENOUS ONCE
Status: COMPLETED | OUTPATIENT
Start: 2017-01-01 | End: 2017-01-01

## 2017-01-01 RX ORDER — PANTOPRAZOLE SODIUM 40 MG/1
40 TABLET, DELAYED RELEASE ORAL EVERY MORNING
Status: DISCONTINUED | OUTPATIENT
Start: 2017-01-01 | End: 2017-01-01 | Stop reason: HOSPADM

## 2017-01-01 RX ORDER — TERCONAZOLE 0.4 %
1 CREAM WITH APPLICATOR VAGINAL AT BEDTIME
Qty: 45 G | Refills: 0 | Status: ON HOLD | OUTPATIENT
Start: 2017-01-01 | End: 2017-01-01

## 2017-01-01 RX ORDER — CEFTRIAXONE 1 G/1
1 INJECTION, POWDER, FOR SOLUTION INTRAMUSCULAR; INTRAVENOUS EVERY 24 HOURS
Status: DISCONTINUED | OUTPATIENT
Start: 2017-01-01 | End: 2017-01-01

## 2017-01-01 RX ORDER — BISACODYL 10 MG
SUPPOSITORY, RECTAL RECTAL
Qty: 2 SUPPOSITORY | Refills: 11 | Status: SHIPPED | OUTPATIENT
Start: 2017-01-01

## 2017-01-01 RX ORDER — HYDROMORPHONE HYDROCHLORIDE 1 MG/ML
1-2 SOLUTION ORAL
Qty: 30 ML | Refills: 0 | Status: SHIPPED | OUTPATIENT
Start: 2017-01-01

## 2017-01-01 RX ORDER — MAGNESIUM SULFATE HEPTAHYDRATE 40 MG/ML
4 INJECTION, SOLUTION INTRAVENOUS EVERY 4 HOURS PRN
Status: DISCONTINUED | OUTPATIENT
Start: 2017-01-01 | End: 2017-01-01

## 2017-01-01 RX ORDER — NALOXONE HYDROCHLORIDE 0.4 MG/ML
.1-.4 INJECTION, SOLUTION INTRAMUSCULAR; INTRAVENOUS; SUBCUTANEOUS
Status: DISCONTINUED | OUTPATIENT
Start: 2017-01-01 | End: 2017-01-01 | Stop reason: HOSPADM

## 2017-01-01 RX ORDER — VIT C/E/ZN/COPPR/LUTEIN/ZEAXAN 60 MG-6 MG
1 CAPSULE ORAL DAILY
Status: ON HOLD | COMMUNITY
End: 2017-01-01

## 2017-01-01 RX ORDER — FUROSEMIDE 20 MG
20 TABLET ORAL DAILY PRN
Qty: 90 TABLET | Refills: 1 | Status: SHIPPED | OUTPATIENT
Start: 2017-01-01

## 2017-01-01 RX ORDER — CEFTRIAXONE 2 G/1
2 INJECTION, POWDER, FOR SOLUTION INTRAMUSCULAR; INTRAVENOUS ONCE
Status: COMPLETED | OUTPATIENT
Start: 2017-01-01 | End: 2017-01-01

## 2017-01-01 RX ORDER — BISACODYL 10 MG
10 SUPPOSITORY, RECTAL RECTAL DAILY PRN
Status: DISCONTINUED | OUTPATIENT
Start: 2017-01-01 | End: 2017-01-01 | Stop reason: HOSPADM

## 2017-01-01 RX ORDER — PROCHLORPERAZINE 25 MG
12.5 SUPPOSITORY, RECTAL RECTAL EVERY 12 HOURS PRN
Status: DISCONTINUED | OUTPATIENT
Start: 2017-01-01 | End: 2017-01-01 | Stop reason: HOSPADM

## 2017-01-01 RX ORDER — PROCHLORPERAZINE MALEATE 5 MG
5 TABLET ORAL EVERY 6 HOURS PRN
Status: DISCONTINUED | OUTPATIENT
Start: 2017-01-01 | End: 2017-01-01 | Stop reason: HOSPADM

## 2017-01-01 RX ORDER — AZITHROMYCIN 250 MG/1
TABLET, FILM COATED ORAL
Qty: 6 TABLET | Refills: 0 | Status: SHIPPED | OUTPATIENT
Start: 2017-01-01 | End: 2017-01-01

## 2017-01-01 RX ORDER — POLYETHYLENE GLYCOL 3350 17 G/17G
17 POWDER, FOR SOLUTION ORAL DAILY PRN
Status: DISCONTINUED | OUTPATIENT
Start: 2017-01-01 | End: 2017-01-01 | Stop reason: HOSPADM

## 2017-01-01 RX ORDER — DIGOXIN 125 MCG
125 TABLET ORAL DAILY
Qty: 90 TABLET | Refills: 3 | Status: SHIPPED | OUTPATIENT
Start: 2017-01-01

## 2017-01-01 RX ORDER — DIGOXIN 125 MCG
125 TABLET ORAL EVERY OTHER DAY
Status: DISCONTINUED | OUTPATIENT
Start: 2017-01-01 | End: 2017-01-01 | Stop reason: HOSPADM

## 2017-01-01 RX ORDER — AZITHROMYCIN 250 MG/1
250 TABLET, FILM COATED ORAL DAILY
Status: COMPLETED | OUTPATIENT
Start: 2017-01-01 | End: 2017-01-01

## 2017-01-01 RX ORDER — DILTIAZEM HYDROCHLORIDE 240 MG/1
240 CAPSULE, EXTENDED RELEASE ORAL DAILY
Status: DISCONTINUED | OUTPATIENT
Start: 2017-01-01 | End: 2017-01-01 | Stop reason: HOSPADM

## 2017-01-01 RX ORDER — CIPROFLOXACIN 250 MG/1
250 TABLET, FILM COATED ORAL 2 TIMES DAILY
Qty: 20 TABLET | Refills: 0 | Status: SHIPPED | OUTPATIENT
Start: 2017-01-01 | End: 2017-01-01

## 2017-01-01 RX ORDER — POTASSIUM CL/LIDO/0.9 % NACL 10MEQ/0.1L
10 INTRAVENOUS SOLUTION, PIGGYBACK (ML) INTRAVENOUS
Status: DISCONTINUED | OUTPATIENT
Start: 2017-01-01 | End: 2017-01-01

## 2017-01-01 RX ORDER — ACETAMINOPHEN 325 MG/1
650 TABLET ORAL EVERY 4 HOURS PRN
Status: DISCONTINUED | OUTPATIENT
Start: 2017-01-01 | End: 2017-01-01 | Stop reason: HOSPADM

## 2017-01-01 RX ORDER — FUROSEMIDE 20 MG
20 TABLET ORAL ONCE
Status: COMPLETED | OUTPATIENT
Start: 2017-01-01 | End: 2017-01-01

## 2017-01-01 RX ORDER — CETIRIZINE HYDROCHLORIDE 10 MG/1
10 TABLET ORAL EVERY EVENING
Qty: 30 TABLET | Refills: 0 | Status: SHIPPED | OUTPATIENT
Start: 2017-01-01 | End: 2017-01-01

## 2017-01-01 RX ORDER — AMPICILLIN AND SULBACTAM 1; .5 G/1; G/1
1.5 INJECTION, POWDER, FOR SOLUTION INTRAMUSCULAR; INTRAVENOUS EVERY 12 HOURS
Status: DISCONTINUED | OUTPATIENT
Start: 2017-01-01 | End: 2017-01-01 | Stop reason: HOSPADM

## 2017-01-01 RX ADMIN — INSULIN ASPART 1 UNITS: 100 INJECTION, SOLUTION INTRAVENOUS; SUBCUTANEOUS at 12:23

## 2017-01-01 RX ADMIN — FUROSEMIDE 40 MG: 10 INJECTION, SOLUTION INTRAVENOUS at 14:19

## 2017-01-01 RX ADMIN — IRON SUCROSE 300 MG: 20 INJECTION, SOLUTION INTRAVENOUS at 12:32

## 2017-01-01 RX ADMIN — INSULIN ASPART 2 UNITS: 100 INJECTION, SOLUTION INTRAVENOUS; SUBCUTANEOUS at 17:20

## 2017-01-01 RX ADMIN — INSULIN ASPART 2 UNITS: 100 INJECTION, SOLUTION INTRAVENOUS; SUBCUTANEOUS at 13:01

## 2017-01-01 RX ADMIN — DILTIAZEM HYDROCHLORIDE 240 MG: 240 CAPSULE, EXTENDED RELEASE ORAL at 08:35

## 2017-01-01 RX ADMIN — FUROSEMIDE 20 MG: 20 TABLET ORAL at 15:09

## 2017-01-01 RX ADMIN — DIGOXIN 125 MCG: 125 TABLET ORAL at 09:25

## 2017-01-01 RX ADMIN — AZITHROMYCIN 250 MG: 250 TABLET, FILM COATED ORAL at 09:04

## 2017-01-01 RX ADMIN — INSULIN ASPART 1 UNITS: 100 INJECTION, SOLUTION INTRAVENOUS; SUBCUTANEOUS at 09:03

## 2017-01-01 RX ADMIN — AMPICILLIN SODIUM AND SULBACTAM SODIUM 1.5 G: 1; .5 INJECTION, POWDER, FOR SOLUTION INTRAMUSCULAR; INTRAVENOUS at 11:32

## 2017-01-01 RX ADMIN — AMPICILLIN SODIUM AND SULBACTAM SODIUM 1.5 G: 1; .5 INJECTION, POWDER, FOR SOLUTION INTRAMUSCULAR; INTRAVENOUS at 22:16

## 2017-01-01 RX ADMIN — AZITHROMYCIN 250 MG: 250 TABLET, FILM COATED ORAL at 09:25

## 2017-01-01 RX ADMIN — IRON SUCROSE 300 MG: 20 INJECTION, SOLUTION INTRAVENOUS at 09:27

## 2017-01-01 RX ADMIN — GABAPENTIN 300 MG: 300 CAPSULE ORAL at 21:37

## 2017-01-01 RX ADMIN — AMPICILLIN SODIUM AND SULBACTAM SODIUM 1.5 G: 1; .5 INJECTION, POWDER, FOR SOLUTION INTRAMUSCULAR; INTRAVENOUS at 11:50

## 2017-01-01 RX ADMIN — AZITHROMYCIN 250 MG: 250 TABLET, FILM COATED ORAL at 08:29

## 2017-01-01 RX ADMIN — METFORMIN HYDROCHLORIDE 500 MG: 500 TABLET, FILM COATED ORAL at 17:22

## 2017-01-01 RX ADMIN — TRAMADOL HYDROCHLORIDE 50 MG: 50 TABLET, COATED ORAL at 00:36

## 2017-01-01 RX ADMIN — GABAPENTIN 300 MG: 300 CAPSULE ORAL at 21:35

## 2017-01-01 RX ADMIN — CEFTRIAXONE 2 G: 2 INJECTION, POWDER, FOR SOLUTION INTRAMUSCULAR; INTRAVENOUS at 14:19

## 2017-01-01 RX ADMIN — RIVAROXABAN 15 MG: 15 TABLET, FILM COATED ORAL at 18:15

## 2017-01-01 RX ADMIN — INSULIN ASPART 2 UNITS: 100 INJECTION, SOLUTION INTRAVENOUS; SUBCUTANEOUS at 17:29

## 2017-01-01 RX ADMIN — PANTOPRAZOLE SODIUM 40 MG: 40 TABLET, DELAYED RELEASE ORAL at 08:35

## 2017-01-01 RX ADMIN — INSULIN ASPART 4 UNITS: 100 INJECTION, SOLUTION INTRAVENOUS; SUBCUTANEOUS at 18:15

## 2017-01-01 RX ADMIN — PANTOPRAZOLE SODIUM 40 MG: 40 TABLET, DELAYED RELEASE ORAL at 09:25

## 2017-01-01 RX ADMIN — RIVAROXABAN 15 MG: 15 TABLET, FILM COATED ORAL at 17:23

## 2017-01-01 RX ADMIN — PANTOPRAZOLE SODIUM 40 MG: 40 INJECTION, POWDER, FOR SOLUTION INTRAVENOUS at 13:45

## 2017-01-01 RX ADMIN — AZITHROMYCIN 250 MG: 250 TABLET, FILM COATED ORAL at 08:35

## 2017-01-01 RX ADMIN — INSULIN ASPART 3 UNITS: 100 INJECTION, SOLUTION INTRAVENOUS; SUBCUTANEOUS at 17:21

## 2017-01-01 RX ADMIN — METFORMIN HYDROCHLORIDE 500 MG: 500 TABLET, FILM COATED ORAL at 18:15

## 2017-01-01 RX ADMIN — AMPICILLIN SODIUM AND SULBACTAM SODIUM 1.5 G: 1; .5 INJECTION, POWDER, FOR SOLUTION INTRAMUSCULAR; INTRAVENOUS at 22:18

## 2017-01-01 RX ADMIN — DILTIAZEM HYDROCHLORIDE 240 MG: 240 CAPSULE, EXTENDED RELEASE ORAL at 09:25

## 2017-01-01 RX ADMIN — SODIUM CHLORIDE: 9 INJECTION, SOLUTION INTRAVENOUS at 17:29

## 2017-01-01 RX ADMIN — DIGOXIN 125 MCG: 125 TABLET ORAL at 08:29

## 2017-01-01 RX ADMIN — PANTOPRAZOLE SODIUM 40 MG: 40 TABLET, DELAYED RELEASE ORAL at 08:29

## 2017-01-01 RX ADMIN — INSULIN ASPART 1 UNITS: 100 INJECTION, SOLUTION INTRAVENOUS; SUBCUTANEOUS at 08:35

## 2017-01-01 RX ADMIN — GABAPENTIN 300 MG: 300 CAPSULE ORAL at 22:15

## 2017-01-01 RX ADMIN — DILTIAZEM HYDROCHLORIDE 240 MG: 240 CAPSULE, EXTENDED RELEASE ORAL at 09:04

## 2017-01-01 RX ADMIN — DILTIAZEM HYDROCHLORIDE 240 MG: 240 CAPSULE, EXTENDED RELEASE ORAL at 08:29

## 2017-01-01 RX ADMIN — AMPICILLIN SODIUM AND SULBACTAM SODIUM 1.5 G: 1; .5 INJECTION, POWDER, FOR SOLUTION INTRAMUSCULAR; INTRAVENOUS at 23:05

## 2017-01-01 RX ADMIN — GABAPENTIN 300 MG: 300 CAPSULE ORAL at 21:11

## 2017-01-01 RX ADMIN — AZITHROMYCIN MONOHYDRATE 500 MG: 500 INJECTION, POWDER, LYOPHILIZED, FOR SOLUTION INTRAVENOUS at 14:02

## 2017-01-01 RX ADMIN — AMPICILLIN SODIUM AND SULBACTAM SODIUM 1.5 G: 1; .5 INJECTION, POWDER, FOR SOLUTION INTRAMUSCULAR; INTRAVENOUS at 11:18

## 2017-01-01 RX ADMIN — RIVAROXABAN 15 MG: 15 TABLET, FILM COATED ORAL at 17:31

## 2017-01-01 RX ADMIN — PANTOPRAZOLE SODIUM 40 MG: 40 TABLET, DELAYED RELEASE ORAL at 10:56

## 2017-01-01 RX ADMIN — AMPICILLIN SODIUM AND SULBACTAM SODIUM 1.5 G: 1; .5 INJECTION, POWDER, FOR SOLUTION INTRAMUSCULAR; INTRAVENOUS at 10:57

## 2017-01-01 RX ADMIN — METFORMIN HYDROCHLORIDE 500 MG: 500 TABLET, FILM COATED ORAL at 17:23

## 2017-01-01 RX ADMIN — INSULIN ASPART 2 UNITS: 100 INJECTION, SOLUTION INTRAVENOUS; SUBCUTANEOUS at 12:48

## 2017-01-01 RX ADMIN — METFORMIN HYDROCHLORIDE 500 MG: 500 TABLET, FILM COATED ORAL at 17:31

## 2017-01-01 ASSESSMENT — ACTIVITIES OF DAILY LIVING (ADL)
TRANSFERRING: 1-->ASSISTIVE EQUIPMENT
TOILETING: 1-->ASSISTIVE EQUIPMENT
PREVIOUS_RESPONSIBILITIES: HOUSEKEEPING;MEDICATION MANAGEMENT
SWALLOWING: 2-->DIFFICULTY SWALLOWING LIQUIDS
NUMBER_OF_TIMES_PATIENT_HAS_FALLEN_WITHIN_LAST_SIX_MONTHS: 1
AMBULATION: 1-->ASSISTIVE EQUIPMENT
FALL_HISTORY_WITHIN_LAST_SIX_MONTHS: YES
COGNITION: 0 - NO COGNITION ISSUES REPORTED
WHICH_OF_THE_ABOVE_FUNCTIONAL_RISKS_HAD_A_RECENT_ONSET_OR_CHANGE?: AMBULATION;TRANSFERRING
RETIRED_COMMUNICATION: 0-->UNDERSTANDS/COMMUNICATES WITHOUT DIFFICULTY
DRESS: 0-->INDEPENDENT
RETIRED_EATING: 0-->INDEPENDENT
BATHING: 0-->INDEPENDENT

## 2017-01-01 ASSESSMENT — ENCOUNTER SYMPTOMS
CONSTIPATION: 1
FEVER: 1
BLOOD IN STOOL: 0
COUGH: 1

## 2017-01-12 NOTE — TELEPHONE ENCOUNTER
Reason for Call:  Same Day Appointment, Requested Provider:  Michael Lopez MD    PCP: Александр Lopez    Reason for visit: pt is having anxieties with the breathing issues shes been having , daughter wants her seen ASAP     Duration of symptoms: a week     Have you been treated for this in the past? Yes    Additional comments: please call nando Hanna [phone]    Can we leave a detailed message on this number? YES    Phone number patient can be reached at: nando Hanna [phone]    Best Time: asap     Call taken on 1/12/2017 at 4:29 PM by Dena Ennis

## 2017-01-16 NOTE — PROGRESS NOTES
"  SUBJECTIVE:                                                    Trina Azevedo is a 89 year old female who presents to clinic today for the following health issues:        Abnormal Mood Symptoms     Onset: August 2016     Description:   Depression: YES  Anxiety: YES    Accompanying Signs & Symptoms:  Still participating in activities that you used to enjoy: no  Fatigue: YES  Irritability: YES  Difficulty concentrating: YES  Changes in appetite: YES  Problems with sleep: YES  Heart racing/beating fast : no  Thoughts of hurting yourself or others: none     History:   Recent stress: no  Prior depression hospitalization: None  Family history of depression: no  Family history of anxiety: no      Precipitating factors:   Alcohol/drug use: no    Alleviating factors:         Therapies Tried and outcome: None    Joint Pain     Onset: with knees surgery     Description:   Location: left wrist, right wrist, left knee and right knee  Character: Sharp, Dull ache, Stabbing, Gnawing and Burning    Intensity: severe    Progression of Symptoms: worse    Accompanying Signs & Symptoms:  Other symptoms: swelling   History:   Previous similar pain: YES      Precipitating factors:   Trauma or overuse: no     Alleviating factors:  Improved by: Ultram sometimes       Therapies Tried and outcome:           Problem list and histories reviewed & adjusted, as indicated.  Additional history:     Patient complaining of nightly anxiety, associated with paroxysms of shortness of breath.  Feels it is difficult to catch her breath at night.  No obvious weight gain, no obvious wheezing, no previous history of reactive airway disease, etc.    ROS:  Constitutional, HEENT, cardiovascular, pulmonary, gi and gu systems are negative, except as otherwise noted.    OBJECTIVE:                                                    /72 mmHg  Pulse 76  Temp(Src) 97.5  F (36.4  C) (Oral)  Resp 15  Ht 5' 3\" (1.6 m)  Wt 171 lb (77.565 kg)  BMI 30.30 kg/m2 "  SpO2 95%  Body mass index is 30.3 kg/(m^2).  GENERAL: healthy, alert and no distress  NECK: no adenopathy, no asymmetry, masses, or scars and thyroid normal to palpation  RESP: lungs clear to auscultation - no rales, rhonchi or wheezes  CV: Regular, stable systolic murmur  ABDOMEN: soft, nontender, no hepatosplenomegaly, no masses and bowel sounds normal  MS: no gross musculoskeletal defects noted, no edema         ASSESSMENT/PLAN:                                                      1. Hypopotassemia    - potassium chloride SA (K-DUR/KLOR-CON M) 10 MEQ CR tablet; Take 1 tablet (10 mEq) by mouth daily On days that furosemide is taken  Dispense: 90 tablet; Refill: prn    2. Primary localized osteoarthrosis, lower leg, left    - traMADol (ULTRAM) 50 MG tablet; Take 1 tablet (50 mg) by mouth every 6 hours as needed for moderate pain  Dispense: 30 tablet; Refill: 0    3. Type 2 diabetes mellitus with diabetic neuropathy, without long-term current use of insulin (H)    - HEMOGLOBIN A1C  - Basic metabolic panel  (Ca, Cl, CO2, Creat, Gluc, K, Na, BUN)    4. Anxiety  Would not be surprised if underlying anxiety is the primary causative agent here, as opposed to cardiorespiratory pathology.  Will check chest x-ray and labs as ordered, if unrevealing discussed possibility of adding mirtazapine nightly to assist with symptoms.  - CBC with platelets  - XR Chest 2 Views; Future    5. MGUS (monoclonal gammopathy of unknown significance)    - CBC with platelets        Александр Lopez MD  [facility]

## 2017-01-16 NOTE — TELEPHONE ENCOUNTER
Please call Greyson @ [facility] [phone] to discuss Zithromax and other med that is causing interaction complication

## 2017-01-17 NOTE — TELEPHONE ENCOUNTER
Daughter called regarding no call yet for interaction between the zithromax and med for Afib. Pharmacy will not fill and is recommending something different for the pneumonia diagonsis. Please call [facility] [phone] as she would like to get her mother started on an abx as soon as possible.

## 2017-01-27 NOTE — TELEPHONE ENCOUNTER
Tried to call patient to book her for same day appointment on 2/3/17 per Dr. Lopez's note below.  No answer.  Will try calling again later.

## 2017-01-27 NOTE — TELEPHONE ENCOUNTER
Reason for Call:  Same Day Appointment, Requested Provider:  Michael Lopez MD    PCP: Александр Lopez    Reason for visit: Follow up from previous visit, antibiotics follow up    Duration of symptoms: 1/16    Have you been treated for this in the past? Yes    Additional comments: Pt has limited time available to be able to come in. Tuesday afternoons (pts advised dr does administrative work at that time.) or Friday afternoons 2pm or after. Next available time under those restrictions is 2/10/17 pt does not want to wait that far out she said that would be back because this is important.    Can we leave a detailed message on this number? YES    Phone number patient can be reached at: Home number on file [phone] (home)    Best Time: asap    Call taken on 1/27/2017 at 8:38 AM by Mari Dixon

## 2017-01-27 NOTE — TELEPHONE ENCOUNTER
She states that you wanted her to come back in for a follow up regarding recent pneumonia and antibiotic treatment.  She is also concerned about the swelling in her left leg that she had mentioned to you at her last office visit.  Patient is wondering if she could come in on Friday, Feb 3rd at 2:00 or after.  She will have transportation at this time.  Please advise.

## 2017-02-03 NOTE — NURSING NOTE
"Chief Complaint   Patient presents with     RECHECK     pneumonia       Initial /58 mmHg  Pulse 58  Temp(Src) 97.6  F (36.4  C) (Oral)  Ht 5' 3\" (1.6 m)  Wt 179 lb (81.194 kg)  BMI 31.72 kg/m2  SpO2 95% Estimated body mass index is 31.72 kg/(m^2) as calculated from the following:    Height as of this encounter: 5' 3\" (1.6 m).    Weight as of this encounter: 179 lb (81.194 kg).  BP completed using cuff size: archana Pond CMA        "

## 2017-02-03 NOTE — PROGRESS NOTES
"  SUBJECTIVE:                                                    Trina Azevedo is a 89 year old female who presents to clinic today for the following health issues:      ED/UC Followup:    Facility:  [facility]  Date of visit: 1/16/17  Reason for visit: Pneumonia  Current Status: swelling in legs and still has cough           Problem list and histories reviewed & adjusted, as indicated.  Additional history:         ROS:  Constitutional, HEENT, cardiovascular, pulmonary, gi and gu systems are negative, except as otherwise noted.    OBJECTIVE:                                                    /58  Pulse 58  Temp 97.6  F (36.4  C) (Oral)  Ht 5' 3\" (1.6 m)  Wt 179 lb (81.2 kg)  SpO2 95%  BMI 31.71 kg/m2  Body mass index is 31.71 kg/(m^2).  GENERAL: healthy, alert and no distress  NECK: no adenopathy, no asymmetry, masses, or scars and thyroid normal to palpation  RESP: lungs clear to auscultation - no rales, rhonchi or wheezes  CV: regular rate and rhythm, normal S1 S2, no S3 or S4, no murmur, click or rub, no peripheral edema and peripheral pulses strong  ABDOMEN: soft, nontender, no hepatosplenomegaly, no masses and bowel sounds normal  MS: no gross musculoskeletal defects noted, no edema    Diagnostic Test Results:  none      ASSESSMENT/PLAN:                                                            1. Pneumonia of right lung due to infectious organism, unspecified part of lung    - CBC with platelets  - XR Chest 2 Views; Future  - SPEECH THERAPY REFERRAL  - levofloxacin (LEVAQUIN) 250 MG tablet; Take 1 tablet (250 mg) by mouth daily  Dispense: 10 tablet; Refill: 0    2. Symptoms involving urinary system    - UA with Microscopic reflex to Culture  - Urine Culture Aerobic Bacterial    3. Dyspnea, unspecified type    - Echocardiogram Complete; Future    4. Urinary tract infection, site unspecified    - levofloxacin (LEVAQUIN) 250 MG tablet; Take 1 tablet (250 mg) by mouth daily  Dispense: 10 tablet; Refill: " 0        Александр Lopez MD  [facility]

## 2017-02-03 NOTE — MR AVS SNAPSHOT
After Visit Summary   2/3/2017    Trina Azevedo    MRN: [id number]           Patient Information     Date Of Birth          3/2/1923        Visit Information        Provider Department      2/3/2017 2:00 PM Алексанрд Lopez MD [facility]        Today's Diagnoses     Pneumonia of right lung due to infectious organism, unspecified part of lung    -  1     Symptoms involving urinary system         Dyspnea, unspecified type         Urinary tract infection, site unspecified           Care Instructions    - Start taking Levaquin once daily for 10 days.  (Prescription has been sent to your pharmacy)    - Schedule the cardiac echo before you leave.    - Speech therapy will contact you to schedule your swallow evaluation.  Please contact us if you do not hear from them.           Follow-ups after your visit        Additional Services     SPEECH THERAPY REFERRAL       *This therapy referral will be filtered to a centralized scheduling office at [facility] and the patient will receive a call to schedule an appointment at a [place] location most convenient for them. *     [facility] provides Speech Therapy evaluation and treatment and many specialty services across the [place] system.  If requesting a specialty program, please choose from the list below.  If you have not heard from the scheduling office within 2 business days, please call [phone] for all locations, with the exception of [place], please call [phone].       Treatment: Evaluation & Treatment  Speech Treatment Diagnosis: Dysphagia  Special Instructions:   Special Programs: Clinical Swallow Study    Please be aware that coverage of these services is subject to the terms and limitations of your health insurance plan.  Call member services at your health plan with any benefit or coverage questions.      **Note to Provider:  If you are referring outside of [place] for the  "therapy appointment, please list the name of the location in the  special instructions  above, print the referral and give to the patient to schedule the appointment.                  Future tests that were ordered for you today     Open Future Orders        Priority Expected Expires Ordered    Echocardiogram Complete Routine  2/3/2018 2/3/2017            Who to contact     If you have questions or need follow up information about today's clinic visit or your schedule please contact [facility] directly at [phone].  Normal or non-critical lab and imaging results will be communicated to you by [employer], letter or phone within 4 business days after the clinic has received the results. If you do not hear from us within 7 days, please contact the clinic through [employer] or phone. If you have a critical or abnormal lab result, we will notify you by phone as soon as possible.  Submit refill requests through [employer] or call your pharmacy and they will forward the refill request to us. Please allow 3 business days for your refill to be completed.          Additional Information About Your Visit        [employer] Information     [employer] lets you send messages to your doctor, view your test results, renew your prescriptions, schedule appointments and more. To sign up, go to [email or web address] . Click on \"Log in\" on the left side of the screen, which will take you to the Welcome page. Then click on \"Sign up Now\" on the right side of the page.     You will be asked to enter the access code listed below, as well as some personal information. Please follow the directions to create your username and password.     Your access code is: [email or web address]  Expires: 2017  3:17 PM     Your access code will  in 90 days. If you need help or a new code, please call your [facility] or [phone].        Care EveryWhere ID     This is your Care EveryWhere ID. This could be used by other organizations " "to access your [place] medical records  [id number]        Your Vitals Were     Pulse Temperature Height BMI (Body Mass Index) Pulse Oximetry       58 97.6  F (36.4  C) (Oral) 5' 3\" (1.6 m) 31.72 kg/m2 95%        Blood Pressure from Last 3 Encounters:   02/03/17 158/58   01/16/17 146/72   09/26/16 132/64    Weight from Last 3 Encounters:   02/03/17 179 lb (81.194 kg)   01/16/17 171 lb (77.565 kg)   09/26/16 170 lb (77.111 kg)              We Performed the Following     CBC with platelets     SPEECH THERAPY REFERRAL     UA with Microscopic reflex to Culture          Today's Medication Changes          These changes are accurate as of: 2/3/17  3:17 PM.  If you have any questions, ask your nurse or doctor.               Start taking these medicines.        Dose/Directions    levofloxacin 250 MG tablet   Commonly known as:  LEVAQUIN   Used for:  Pneumonia of right lung due to infectious organism, unspecified part of lung, Urinary tract infection, site unspecified   Started by:  Александр Lopez MD        Dose:  250 mg   Take 1 tablet (250 mg) by mouth daily   Quantity:  10 tablet   Refills:  0         Stop taking these medicines if you haven't already. Please contact your care team if you have questions.     cefUROXime 250 MG tablet   Commonly known as:  CEFTIN   Stopped by:  Александр Lopez MD                Where to get your medicines      These medications were sent to [id number] - [address] AT [facility] [address] [place] [id number]     Phone:  [phone]    - levofloxacin 250 MG tablet             Primary Care Provider Office Phone # Fax #    Александр Lopez [phone]       [facility] [address]  [facility] [address]        Thank you!     Thank you for choosing [facility]  for your care. Our goal is always to provide you with excellent care. Hearing back from our " patients is one way we can continue to improve our services. Please take a few minutes to complete the written survey that you may receive in the mail after your visit with us. Thank you!             Your Updated Medication List - Protect others around you: Learn how to safely use, store and throw away your medicines at www.disposemymeds.org.          This list is accurate as of: 2/3/17  3:17 PM.  Always use your most recent med list.                   Brand Name Dispense Instructions for use    ascorbic acid 500 MG tablet    VITAMIN C    30 tablet    Take 1 tablet (500 mg) by mouth daily       blood glucose monitoring test strip    ONE TOUCH ULTRA    100 each    Use to test blood sugars 1-2 times daily or as directed.       calcium + D 600-200 MG-UNIT Tabs   Generic drug:  calcium carbonate-vitamin D      Take 1 tablet by mouth daily.       CENTRUM SILVER per tablet      Take 1 tablet by mouth daily.       digoxin 125 MCG tablet    LANOXIN    90 tablet    Take 1 tablet (125 mcg) by mouth daily       diltiazem 240 MG 24 hr capsule    DILACOR XR    90 capsule    Take 1 capsule (240 mg) by mouth daily       furosemide 20 MG tablet    LASIX    90 tablet    Take 1 tablet (20 mg) by mouth daily as needed       gabapentin 300 MG capsule    NEURONTIN    90 capsule    Take 1 capsule (300 mg) by mouth At Bedtime       levofloxacin 250 MG tablet    LEVAQUIN    10 tablet    Take 1 tablet (250 mg) by mouth daily       metFORMIN 500 MG tablet    GLUCOPHAGE    180 tablet    Take 1 tablet (500 mg) by mouth 2 times daily (with meals)       potassium chloride SA 10 MEQ CR tablet    K-DUR/KLOR-CON M    90 tablet    Take 1 tablet (10 mEq) by mouth daily On days that furosemide is taken       rivaroxaban ANTICOAGULANT 15 MG Tabs tablet    XARELTO    30 tablet    Take 1 tablet (15 mg) by mouth daily (with dinner)       traMADol 50 MG tablet    ULTRAM    30 tablet    Take 1 tablet (50 mg) by mouth every 6 hours as needed for moderate  pain

## 2017-02-04 NOTE — TELEPHONE ENCOUNTER
Call Type: Triage Call    Presenting Problem: Caller statesthat she saw PCP this aftrnoon and  dwas given prescription for levaquin for persistant pneumonia;  states she read the drug insert instructions and precautions and is  concerned about developing  tendinitis and does not want to take it;  advised that this is rare occurance (.1-1% per micromedex)  but  states  she wants a different drug.  Review of EPIC reveals she did  not take Zithromax due to A-fib and was on Ceftin but did not  improve. Call placed to on call for PCP  Dr Combs    via   @  2035 to call FNA; Instructed t oadvise patient that this is only  antibiotic appropriate and that she should continue taking it; call  PCP on Monday if has further concerns.  Triage Note:  Guideline Title: Medication Questions - Adult  Recommended Disposition: Speak with Provider or Pharmacist  within 24 hours  Original Inclination: Wanted to speak with a nurse  Override Disposition:  Intended Action: Follow advice given  Physician Contacted: No  Has questions about prescribed and/or nonprescribed medications not covered by  available resources ?  YES  Pregnant and has medication questions regarding prescribed and/or nonprescribed  medication(s) not covered by available resources ? NO  Breastfeeding and has medication questions regarding prescribed and/or  nonprescribed medication(s) not covered by available resources ? NO  Sign(s) or symptom(s) associated with a diagnosed condition or with a new illness  ? NO  Prescription ordered today and not available at pharmacy putting patient at  clinical risk ? NO  Recurrence of a symptom(s) or illness post prescribed medication treatment AND  provider instructed patient to call if symptom(s) returned. ? NO  Unable to obtain prescribed medication related to available resources AND  situation poses immediate clinical risk ? NO  Pharmacy calling to clarify prescription order. ? NO  Requests refill of prescribed medication  that does NOT have a valid refill; lack  of medication may cause clinical risk to patient if not available. ? NO  Requests refill of prescribed medication without valid refills OR requests refill  of prescribed medication with valid refills but does not have prescription number  (no RX container); lack of medication does not put patient at clinical risk ? NO  Physician Instructions:  Care Advice:

## 2017-02-09 NOTE — PROGRESS NOTES
[facility] Outpatient Clinical Swallow Study   02/09/17 0430   General Information   Type Of Visit Initial   Start Of Care Date 02/09/17   Referring Physician Dr. Александр Lopez   Orders Evaluate And Treat   Orders Comment Clinical swallow study   Medical Diagnosis Pneumonia of right lung due to infectious organism, unspecified part of lung   Onset Of Illness/injury Or Date Of Surgery 02/03/17  (order date)   Hearing Bilateral hearing aids   Pertinent History of Current Problem/OT: Additional Occupational Profile Info 89-yr-old female patient with recurrent PNA since Jan 2017. 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 Patient endorses excessive coughing throughout the day, burping/belching after meals, dry mouth, postnasal drip and excessive phlegm in the throat, and chest tightness with difficulty breathing at night that resolves with going outside. Patient feels these issues have gotten increasingly worse in the past 3 years. Patient denies pain on swallow.    Respiratory Status Room air   Prior Level Of Function Swallowing   Prior Level Of Function Comment Regular diet, thin liquids. Avoids some hard to chew consistencies (e.g. tough meat).   Patient Role/employment History Retired   Living Environment (Lives with daughter, Anabella, who does most meal prep)   Patient/family Goals To have pneumonia resolve and cough less   General Information Comments PT's daughter, , present for evaluation.    Pain Assessment   Pain Reported No   Clinical Swallow Evaluation   Oral Musculature generally intact   Structural Abnormalities none present   Dentition upper dentures  (partial on bottom. Pt says dentures don't fit well.)   Mandibular Strength and Mobility intact   Oral Labial Strength and Mobility WFL   Lingual Strength and Mobility WFL   Velar Elevation intact   Buccal Strength and Mobility intact   Laryngeal Function Cough;Throat  clear;Swallow;Voicing initiated   Oral Musculature Comments WNL   Additional Documentation Yes   Clinical Swallow Eval: Thin Liquid Texture Trial   Mode of Presentation, Thin Liquids self-fed;cup   Volume of Liquid or Food Presented 3 oz water    Oral Phase of Swallow WFL   Pharyngeal Phase of Swallow impaired;coughing/choking   Diagnostic Statement Dry cough after short delay following 1/8 swallows of thin liquids by cup. No coughing with consecutive sips. Pt endorsing she feels a long transit of liquid through pharynx.     Clinical Swallow Eval: Puree Solid Texture Trial   Mode of Presentation, Puree spoon;self-fed   Volume of Puree Presented 2 teaspoons of applesauce   Oral Phase, Puree WFL   Pharyngeal Phase, Puree intact   Diagnostic Statement No overt s/sx of penetration/aspiration.    Clinical Swallow Eval: Semisolid Texture Trial   Mode of Presentation, Semisolid spoon;self-fed   Volume of Semisolid Food Presented 1/4 alber cracker in applesauce   Oral Phase, Semisolid WFL   Pharyngeal Phase, Semisolid impaired;feeling of something stuck in throat   Diagnostic Statement No overt s/sx of aspiration. Patient reporting mild globus sensation after swallows.    Clinical Swallow Eval: Solid Food Texture Trial   Mode of Presentation, Solid self-fed   Volume of Solid Food Presented 1/2 alber cracker   Oral Phase, Solid (slow mastication)   Pharyngeal Phase, Solid impaired;coughing/choking;feeling of something stuck in throat;repeated swallows   Diagnostic Statement Patient demonstrating a dry, delayed cough following alber cracker trial. Coughed out some cracker pieces but likely from oral residue. Patient needed 2 swallows and a liquid wash to clear globus sensation.    VFSS Evaluation   VFSS Additional Documentation Yes  (SLP recommends patient have Video Swallow Study)   Educational Assessment   Barriers to Learning No barriers   Preferred Learning Style Listening;Reading;Demonstration;Pictures/video   General  Therapy Interventions   Planned Therapy Interventions Dysphagia Treatment   Dysphagia treatment Instruction of safe swallow strategies   Swallow Eval: Clinical Impressions   Skilled Criteria for Therapy Intervention Skilled criteria met.  Treatment indicated.   Functional Assessment Scale (FAS) 5   Treatment Diagnosis Mild oralpharyngeal dysphagia   Diet texture recommendations Regular diet;Thin liquids  (avoid dry, hard to chew consistencies)   Recommended Feeding/Eating Techniques no straws;small sips/bites;alternate between small bites and sips of food/liquid  (no bottles)   Rehab Potential good, to achieve stated therapy goals   Therapy Frequency (1x/week for 10 weeks)   Risks and Benefits of Treatment have been explained. Yes   Patient, family and/or staff in agreement with Plan of Care Yes   Clinical Impression Comments Ms. Azevedo presents with mild oral-pharyngeal dysphagia characterized by coughing on thin liquids and dry solids, report of globus sensation after semi- and solid consistencies, and repeat swallows on solids. Coughing was not consistent on thin liquids and patient was able to take consecutive drinks by cup without coughing.  Coughing with both liquids and solids was delayed and never had a wet quality, nor were any voice changes detected. Patient reporting sensation of more phlegm which lead to coughing after PO trials. Oral motor exam was unremarkable and hyolaryngeal movement is adequate on palpation. Aspiration risk factors include: poor fitting dentition, recurrent PNAs, history of stroke, and possible reflux. Unable to rule out aspiration during evaluation today. Questioning if reflux or post-nasal drip are contributing to swallowing and breathing difficulties. RECOMMEND: MD- please order a videofluoroscopic swallow study (VFSS) for further instrumental evaluation of swallow, to rule out silent aspiration, and determine safest diet consistencies at this time. Patient will benefit from  skilled SLP intervention to train safe swallow strategies, ensure diet tolerance, and initiate exercise program for strengthening (pending results from VFSS). If VFSS is normal, may consider referral to GI to evaluate potential reflux symptoms such as coughing after meals and at night while laying down, or ENT to address post-nasal drip.  Treatment started after evaluation today.    Swallow Goals   SLP Swallow Goals 1;2   Swallow Goal 1   Goal Description To increase safety with PO intake, patient will learn and demonstrate use of 2 safe swallow strategies independently across 1 week of meals, per patient/family report.    Target Date 05/10/17   Swallow Goal 2   Goal Description Patient will tolerate the least restrictive diet over 1 week of eating with overt s/sx of coughing/choking, per patient/family report.    Target Date 05/10/17   Total Session Time   Total Session Time 30   Total Evaluation Time 15   Therapy Certification   Certification date from 02/09/17   Certification date to 05/10/17   Medical Diagnosis Pneumonia of right lung due to infectious organism, unspecified part of lung     Thank you for your referral of this patient.      Carol Rodriguez MA, CCC-SLP  Speech-Language Pathology  [facility]  Phone: [phone]  Pager: [phone]

## 2017-02-09 NOTE — PROGRESS NOTES
[facility]          OUTPATIENT SWALLOW  EVALUATION  PLAN OF TREATMENT FOR OUTPATIENT REHABILITATION  (COMPLETE FOR INITIAL CLAIMS ONLY)  Patient's Last Name, First Name, M.I.  YOB: 1923  Trina Azevedo     Provider's Name   [facility]   Medical Record No.  [id number]     Start of Care Date:  02/09/17   Onset Date:  02/03/17 (order date)   Type:     ___PT   ____OT  ___X_SLP Medical Diagnosis:  Pneumonia of right lung due to infectious organism, unspecified part of lung     Treatment Diagnosis:  Mild oralpharyngeal dysphagia Visits from SOC:  1     _________________________________________________________________________________  Plan of Treatment/Functional Goals:  Planned Therapy Interventions: Dysphagia Treatment  Dysphagia treatment: Instruction of safe swallow strategies                     Goals   1.         Goal Description: To increase safety with PO intake, patient will learn and demonstrate use of 2 safe swallow strategies independently across 1 week of meals, per patient/family report.        Target Date: 05/10/17           2.         Goal Description: Patient will tolerate the least restrictive diet over 1 week of eating with overt s/sx of coughing/choking, per patient/family report.        Target Date: 05/10/17           3.                             4.                             5.                            6.                              7.                              8.                              Therapy Frequency:  (1x/week for 10 weeks)       Carlo Rodriguez, SLP       I CERTIFY THE NEED FOR THESE SERVICES FURNISHED UNDER        THIS PLAN OF TREATMENT AND WHILE UNDER MY CARE     (Physician co-signature of this document indicates review and certification of the therapy plan).                  Certification date from: 02/09/17 Certification date to:  05/10/17          Referring Physician: Dr. Александр Lopez    Initial Assessment        See Epic Evaluation Start Of Care Date: 02/09/17

## 2017-02-13 NOTE — TELEPHONE ENCOUNTER
Spoke to daughter. They recommended a follow up CXR to make sure it's clear. She said that they thinks she's aspirating when she eats crackers.  RADHA Garner LPN

## 2017-02-13 NOTE — TELEPHONE ENCOUNTER
Future order for x-ray placed.    I also placed repeat Speech Path order for the video swallow study, per instructions of the speech therapist.

## 2017-02-13 NOTE — TELEPHONE ENCOUNTER
Dr Lopez referred pt to a specialist to do food swallow testing, they suggested an xray be done.  This is an order needed for an xray. Pt is coming in on Wed 2-15 at 10:30, they are hoping to get an xray done that day after appt.

## 2017-02-17 NOTE — TELEPHONE ENCOUNTER
There has been no obvious improvement in the right lower lobe infiltrate, but the radiologists now feel that this is a chronic change, based on comparison with old chest x-rays.  Whether or not this infiltrate is chronic or acute, it doesn't change our current plan - finish the antibiotics that we had started empirically, and proceed with swallow evaluation.  As long as she continues to feel better as she finishes the antibiotics, I don't think that additional chest x-rays are warranted for this episode.

## 2017-02-17 NOTE — TELEPHONE ENCOUNTER
Please call pt with the results of her Echo and Xray from 2/15/17  [phone]- ok to leave the details on the machine if she does not answer

## 2017-02-17 NOTE — TELEPHONE ENCOUNTER
Called Trina and informed her of the xray results, she is done with antibiotics, feels better, and is still going through with swallow study.  Tammy Pond, CMA

## 2017-02-17 NOTE — TELEPHONE ENCOUNTER
Called patient and informed her of echocardiogram results.  Patient is wondering about xray results from 2/15.  She feels much better however would like to know if pneumonia is still present.  No note from Dr. Lopez regarding this.  Please advise.

## 2017-02-28 NOTE — PROGRESS NOTES
02/28/17 1700   General Information   Type Of Visit Initial   Start Of Care Date 02/09/17   Referring Physician Dr. Александр Lopez   Medical Diagnosis Dysphagia; Pneumonia of right lung due to infectious organism, unspecified part of lung   Onset Of Illness/injury Or Date Of Surgery 02/03/17   Patient/family Goals To have pneumonia resolve and cough less; Patient/family agreed to POC goals and to trial diet modification/use of strategies per POC.   General Information Comments 89-yr-old female patient with recurrent PNA since Jan 2017. 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 Patient endorses excessive coughing throughout the day, burping/belching after meals, dry mouth, postnasal drip and excessive phlegm in the throat, and difficulty breathing at night that resolves with going outside. Patient feels these issues have gotten increasingly worse in the past 3 years. Patient denies pain on swallow.    FALL RISK SCREEN   Comments See radiology staff/prior documentation.   VFSS Eval: Radiology   Radiologist Dr. Jeter   Views Taken left lateral   Physical Location of Procedure [facility]   VFSS Eval: Thin Liquid Texture Trial   Mode of Presentation, Thin Liquid cup   Order of Presentation 1, 2, 3, 4, 5, 6, 13, 14   Preparatory Phase Poor bolus control   Oral Phase, Thin Liquid Premature pharyngeal entry   Pharyngeal Phase, Thin Liquid Delayed swallow reflex   Rosenbek's Penetration Aspiration Scale: Thin Liquid Trial Results 3 - contrast remains above the vocal cords, visible residue remains (penetration)   Diagnostic Statement Delayed swallow reflex into the vestibule at times,  weak base of tongue, mild/mild-minimal decreased epiglottic inversion, curved epiglottis, mild-min pharyngeal residue, mod-deep penetration with trials by cup, the chin tuck was attempted; however, patient had difficulty coordinating use of the chin tuck with flash mild to moderate  penetration noted at times; patient was able to coordinate the chin tuck given increased cues on last trial 14 with no penetration observed,  tight UES with min residue at UES   VFSS Eval: Nectar Thick Liquid Texture Trial   Mode of Presentation, Nectar cup   Order of Presentation 7, 8, 9   Preparatory Phase Poor bolus control   Oral Phase, Nectar Premature pharyngeal entry;Residue in oral cavity   Pharyngeal Phase, Nectar Delayed swallow reflex   Rosenbek's Penetration Aspiration Scale: Nectar-Thick Liquid Trial Results 2 - contrast enters airway, remains above the vocal cords, no residue remains (penetration)   Diagnostic Statement Delayed swallow reflex, weak base of tongue, mild/mild-minimal decreased epiglottic inversion, curved epiglottis, mild-min pharyngeal residue, bolus under the tip of the epiglottis at times, mod penetration during trial 7 by cup with large bolus size, no penetration trials 8 and 9 with cues to take small sips,  tight UES with min residue at UES   VFSS Eval: Honey Thick Texture Trial   Order of Presentation Did not test given performance with other textures   VFSS Eval: Puree Solid Texture Trial   Mode of Presentation, Puree spoon   Order of Presentation 10   Preparatory Phase Poor bolus control   Oral Phase, Puree Residue in oral cavity;Premature pharyngeal entry   Pharyngeal Phase, Puree (no delay)   Rosenbek's Penetration Aspiration Scale: Puree Food Trial Results 1 - no aspiration, contrast does not enter airway   Diagnostic Statement Weak base of tongue, mild/mild-minimal decreased epiglottic inversion, curved epiglottis, min-no pharyngeal residue, bolus under the tip of the epiglottis, tight UES with min residue at UES   VFSS Eval: Semisolid Texture Trial   Mode of Presentation, Semisolid spoon   Order of Presentation 11   Preparatory Phase Poor bolus control  (decreased rotary mastication)   Oral Phase, Semisolid Residue in oral cavity;Premature pharyngeal entry   Pharyngeal  Phase, Semisolid Delayed swallow reflex   Rosenbek's Penetration Aspiration Scale: Semisolid Food Trial Results 1 - no aspiration, contrast does not enter airway   Diagnostic Statement Weak base of tongue, delayed swallow, mild decreased epiglottic inversion, curved epiglottis, mild-mod pharyngeal residue, bolus under the tip of the epiglottis, tight UES with min residue at UES   VFSS Eval: Solid Food Texture Trial   Mode of Presentation, Solid spoon   Order of Presentation 12   Preparatory Phase Poor bolus control  (decreased rotary mastication)   Oral Phase, Solid Premature pharyngeal entry   Pharyngeal Phase, Solid Delayed swallow reflex   Rosenbek's Penetration Aspiration Scale: Solid Food Trial Results 1 - no aspiration, contrast does not enter airway   Diagnostic Statement Weak base of tongue, delayed swallow, mild/mild-minimal decreased epiglottic inversion, curved epiglottis, min-mild pharyngeal residue, bolus under the tip of the epiglottis, tight UES with min residue at UES   Educational Assessment   Barriers to Learning (Recall)   Preferred Learning Style Listening;Pictures/video;Demonstration;Reading   Swallow Eval: Clinical Impressions   Skilled Criteria for Therapy Intervention Skilled criteria met.  Treatment indicated.   Functional Assessment Scale (FAS) 5   Dysphagia Outcome Severity Scale (ANTONELLA) Level 5 - ANTONELLA   Treatment Diagnosis mild oral-pharyngeal dysphagia   Diet texture recommendations Dysphagia diet level 3;Thin liquids   Recommended Feeding/Eating Techniques (see below)   Rehab Potential good, to achieve stated therapy goals   Therapy Frequency (1x/week)   Predicted Duration of Therapy Intervention (days/wks) 10 weeks   Anticipated Discharge Disposition home w/ outpatient services   Risks and Benefits of Treatment have been explained. Yes   Patient, family and/or staff in agreement with Plan of Care Yes   Clinical Impression Comments Patient presents with mild oral-pharyngeal dysphagia per  study results.  Deficits include decreased rotary mastication, oral residue, weak base of tongue function, delayed swallow reflex, mild-minimal decreased epiglottic inversion, and tight UES.  Deficits resulted in mild-min to mild-moderate pharygneal residue, bolus under/around the epiglottis at times, moderate penetration with large sip of nectar thick liquids by cup, and mod-deep penetration with thin liquids by cup.  Patient had difficulty coordinating a chin tuck initially with penetration observed during intial trials.  Patient was able to coordinate the chin tuck with thin liquids by cup to prevent penetration during trial 14 during the study.  Recommend a dysphagia diet level 3 and thin liquids with safe swallow strategies including: sit up at 90 degrees, remain upright for 30-60 minutes after eating, small bites/sips, chin tuck with small sips of thin liquids, 2 swallows per trial, alternate textures.  Swallow Tx with education was provided after the study.  Recommend continued OP swallow Tx to train strategies including the chin tuck, insure diet tolerance/advance to regular solids as indicated, and complete swallow Tx exercises.  Patient and family verbalized understanding of education provided in swallow Tx.  OP clinic to contact patient/family to schedule follow up OP clinical swallow Tx.   Swallow Goals   SLP Swallow Goals 1, 2, 3   Swallow Goal 1   Goal Description To increase safety with PO intake, patient will learn and demonstrate use of 2 safe swallow strategies independently across 1 week of meals, per patient/family report.    Target Date 05/10/17   Swallow Goal 2   Goal Description Patient will tolerate the least restrictive diet over 1 week of eating with overt s/sx of coughing/choking, per patient/family report.    Target Date 05/10/17   Swallow Goal 3   Goal Description Patient will complete 10-20 reps x 5 oral-pharyngeal exercises to improve function to maximize safety for a least restrictive  diet.   Target Date 05/10/17   Total Session Time   Total Session Time 2:20-2:32pm (12 minutes video swallow study); 2:32-2:55pm (23 minutes swallow Tx)

## 2017-03-15 NOTE — TELEPHONE ENCOUNTER
blood glucose monitoring (ONE TOUCH ULTRA) test strip      Last Written Prescription Date: 9/14/2016  Last Fill Quantity: 100,  # refills: 1   Last Office Visit with [facility], [facility] or [facility] prescribing provider: 2/03/2017

## 2017-04-11 NOTE — TELEPHONE ENCOUNTER
Reason for Call:  Other call back and orders    Detailed comments: pt wants to bring in a sample of her bloody urine, states that it hurts when she goes pee.    Phone Number Patient can be reached at: Home number on file [phone] (home)    Best Time: any    Can we leave a detailed message on this number? YES    Call taken on 4/11/2017 at 7:33 AM by Surinder Montalvo

## 2017-04-11 NOTE — MR AVS SNAPSHOT
"              After Visit Summary   4/11/2017    Trina Azevedo    MRN: [id number]           Patient Information     Date Of Birth          3/2/1923        Visit Information        Provider Department      4/11/2017 1:00 PM Danny Pulido, JACKELINE [facility]        Today's Diagnoses     Dysuria    -  1    Hematuria           Follow-ups after your visit        Your next 10 appointments already scheduled     May 09, 2017  2:15 PM CDT   Treatment 45 with Carol Rodriguez, SLP   [facility] Speech Therapy ([facility])    [address] [place] [id number] [phone]              Future tests that were ordered for you today     Open Future Orders        Priority Expected Expires Ordered    UA with Microscopic reflex to Culture Routine  5/11/2017 4/11/2017            Who to contact     If you have questions or need follow up information about today's clinic visit or your schedule please contact [facility] directly at [phone].  Normal or non-critical lab and imaging results will be communicated to you by Fligg[employer]art, letter or phone within 4 business days after the clinic has received the results. If you do not hear from us within 7 days, please contact the clinic through Fliggohart or phone. If you have a critical or abnormal lab result, we will notify you by phone as soon as possible.  Submit refill requests through PlanStan or call your pharmacy and they will forward the refill request to us. Please allow 3 business days for your refill to be completed.          Additional Information About Your Visit        [employer] Information     PlanStan lets you send messages to your doctor, view your test results, renew your prescriptions, schedule appointments and more. To sign up, go to [email or web address] . Click on \"Log in\" on the left side of the screen, which will take you to the Welcome page. Then click on \"Sign up Now\" on the " right side of the page.     You will be asked to enter the access code listed below, as well as some personal information. Please follow the directions to create your username and password.     Your access code is: [email or web address]  Expires: 2017  4:17 PM     Your access code will  in 90 days. If you need help or a new code, please call your [facility] or [phone].        Care EveryWhere ID     This is your Care EveryWhere ID. This could be used by other organizations to access your [place] medical records  [id number]        Your Vitals Were     Pulse Temperature Pulse Oximetry BMI (Body Mass Index)          58 98.4  F (36.9  C) (Oral) 91% 29.76 kg/m2         Blood Pressure from Last 3 Encounters:   17 138/90   17 158/58   17 146/72    Weight from Last 3 Encounters:   17 168 lb (76.2 kg)   17 179 lb (81.2 kg)   17 171 lb (77.6 kg)              We Performed the Following     UA with Microscopic     Urine Culture Aerobic Bacterial          Today's Medication Changes          These changes are accurate as of: 17  1:37 PM.  If you have any questions, ask your nurse or doctor.               Start taking these medicines.        Dose/Directions    ciprofloxacin 250 MG tablet   Commonly known as:  CIPRO   Used for:  Dysuria, Hematuria   Started by:  Danny Pulido PA-C        Dose:  250 mg   Take 1 tablet (250 mg) by mouth 2 times daily for 10 days   Quantity:  20 tablet   Refills:  0         Stop taking these medicines if you haven't already. Please contact your care team if you have questions.     levofloxacin 250 MG tablet   Commonly known as:  LEVAQUIN   Stopped by:  Danny Pulido PA-C                Where to get your medicines      [facility] [address] [place] [address] AT [facility] & [address] [facility] [address]     Phone:  [phone]     ciprofloxacin 250 MG tablet                 Primary Care Provider Office Phone # Fax #    Александр Michael Lopez [phone]       [facility] [address]  [facility] [address]        Thank you!     Thank you for choosing [facility]  for your care. Our goal is always to provide you with excellent care. Hearing back from our patients is one way we can continue to improve our services. Please take a few minutes to complete the written survey that you may receive in the mail after your visit with us. Thank you!             Your Updated Medication List - Protect others around you: Learn how to safely use, store and throw away your medicines at www.disposemymeds.org.          This list is accurate as of: 4/11/17  1:37 PM.  Always use your most recent med list.                   Brand Name Dispense Instructions for use    ascorbic acid 500 MG tablet    VITAMIN C    30 tablet    Take 1 tablet (500 mg) by mouth daily       blood glucose monitoring test strip    ONE TOUCH ULTRA    100 each    Use to test blood sugars 1-2 times daily or as directed.       calcium + D 600-200 MG-UNIT Tabs   Generic drug:  calcium carbonate-vitamin D      Take 1 tablet by mouth daily.       CENTRUM SILVER per tablet      Take 1 tablet by mouth daily.       ciprofloxacin 250 MG tablet    CIPRO    20 tablet    Take 1 tablet (250 mg) by mouth 2 times daily for 10 days       digoxin 125 MCG tablet    LANOXIN    90 tablet    Take 1 tablet (125 mcg) by mouth daily       diltiazem 240 MG 24 hr capsule    DILACOR XR    90 capsule    Take 1 capsule (240 mg) by mouth daily       furosemide 20 MG tablet    LASIX    90 tablet    Take 1 tablet (20 mg) by mouth daily as needed       gabapentin 300 MG capsule    NEURONTIN    90 capsule    Take 1 capsule (300 mg) by mouth At Bedtime       metFORMIN 500 MG tablet    GLUCOPHAGE    180 tablet    Take 1 tablet (500 mg) by mouth 2 times daily (with meals)       potassium chloride SA  10 MEQ CR tablet    K-DUR/KLOR-CON M    90 tablet    Take 1 tablet (10 mEq) by mouth daily On days that furosemide is taken       rivaroxaban ANTICOAGULANT 15 MG Tabs tablet    XARELTO    30 tablet    Take 1 tablet (15 mg) by mouth daily (with dinner)       traMADol 50 MG tablet    ULTRAM    30 tablet    Take 1 tablet (50 mg) by mouth every 6 hours as needed for moderate pain

## 2017-04-11 NOTE — PROGRESS NOTES
"SUBJECTIVE:   Trina Azevedo is a 89 year old female who  presents today for a possible UTI. Symptoms of dysuria, urgency and frequency have been going on for fewday(s).  Hematuria yes .  sudden onsetand mild and moderate.  There is no history of fever, chills, nausea or vomiting.   This patient does  have a history of urinary tract infections. Patient denies long duration, rigors, flank pain, temperature > 101 degrees F. and Vomiting, significant nausea or diarrhea or vaginal discharge     Past Medical History:   Diagnosis Date     Acute pancreatitis 1986    gallstone induced     Atrial fibrillation (H) 2001    paroxysmal, stress thallium 2005- normal.  Patient refused warfarin, now on Xarelto     Essential hypertension, benign      Restless legs syndrome      Rubella without mention of complication      Stroke (H) 12/2010     Type 2 diabetes, HbA1C goal < 8% (H) 2008     Unspecified sinusitis (chronic)      Unspecified venous (peripheral) insufficiency      Allergies   Allergen Reactions     Mucinex      Felt jittery-possibly just Mucinex- D due to the pseudoephedrine component     Vicodin [Hydrocodone-Acetaminophen] GI Disturbance     Pt tolerates acetaminophen  Pt states that \"Percodent\" gives her hallucinations     Social History   Substance Use Topics     Smoking status: Former Smoker     Smokeless tobacco: Never Used      Comment: quit in 1985     Alcohol use No      Comment: hx of pancreatitis       ROS:   CONSTITUTIONAL:NEGATIVE for fever, chills, change in weight  INTEGUMENTARY/SKIN: NEGATIVE for worrisome rashes, moles or lesions  GI: NEGATIVE for nausea, abdominal pain, heartburn, or change in bowel habits  : Positive for hematuria and urgency  MUSCULOSKELETAL: NEGATIVE for significant arthralgias or myalgia  NEURO: NEGATIVE for weakness, dizziness or paresthesias    OBJECTIVE:  /90 (BP Location: Right arm, Patient Position: Chair, Cuff Size: Adult Regular)  Pulse 58  Temp 98.4  F (36.9  C) " (Oral)  Wt 168 lb (76.2 kg)  SpO2 91%  BMI 29.76 kg/m2  GENERAL APPEARANCE: healthy, alert and no distress  ABDOMEN:  soft, nontender, no HSM or masses and bowel sounds normal  BACK: No CVA tenderness  SKIN: no suspicious lesions or rashes  NEURO: Normal strength and tone, sensory exam grossly normal,  normal speech and mentation    Results for orders placed or performed in visit on 04/11/17   UA with Microscopic   Result Value Ref Range    Color Urine Red     Appearance Urine Slightly Cloudy     Glucose Urine 100 (A) NEG mg/dL    Bilirubin Urine (A) NEG     Small  This is an unconfirmed screening test result. A positive result may be false.      Ketones Urine Negative NEG mg/dL    Specific Gravity Urine <=1.005 1.003 - 1.035    pH Urine 5.5 5.0 - 7.0 pH    Protein Albumin Urine 100 (A) NEG mg/dL    Urobilinogen Urine 0.2 0.2 - 1.0 EU/dL    Nitrite Urine Negative NEG    Blood Urine Large (A) NEG    Leukocyte Esterase Urine Small (A) NEG    Source Midstream Urine     WBC Urine 10-25 (A) 0 - 2 /HPF    RBC Urine 10-25 (A) 0 - 2 /HPF    Squamous Epithelial /LPF Urine Few FEW /LPF    Bacteria Urine Few (A) NEG /HPF       ASSESSMENT:   Lower, uncomplicated urinary tract infection.    PLAN:  Orders Placed This Encounter     UA with Microscopic     ciprofloxacin (CIPRO) 250 MG tablet       As per ordered above  Drink plenty of fluids.  Prevention and treatment of UTI's discussed.Signs and symptoms of pyelonephritis mentioned.  Follow up with primary care physician if not improving

## 2017-04-11 NOTE — TELEPHONE ENCOUNTER
Pt is complaining of a lot of pain, and reports sample looks like mostly blood.  Advised to come in to be seen in the UC.  Pt agrees with plan.  Will call her daughter to bring her. meera

## 2017-04-18 NOTE — MR AVS SNAPSHOT
"              After Visit Summary   4/18/2017    Trina Azevedo    MRN: [id number]           Patient Information     Date Of Birth          3/2/1923        Visit Information        Provider Department      4/18/2017 1:15 PM Agata Moon PA-C [facility]        Today's Diagnoses     Vaginal itching    -  1    Allergic reaction, initial encounter        Intertrigo           Follow-ups after your visit        Your next 10 appointments already scheduled     May 09, 2017  2:15 PM CDT   Treatment 45 with Carol Rodriguez, SLP   [facility] Speech Therapy ([facility])    [address] [id number] [phone]              Who to contact     If you have questions or need follow up information about today's clinic visit or your schedule please contact [facility] directly at [phone].  Normal or non-critical lab and imaging results will be communicated to you by [employer], letter or phone within 4 business days after the clinic has received the results. If you do not hear from us within 7 days, please contact the clinic through [employer] or phone. If you have a critical or abnormal lab result, we will notify you by phone as soon as possible.  Submit refill requests through [employer] or call your pharmacy and they will forward the refill request to us. Please allow 3 business days for your refill to be completed.          Additional Information About Your Visit        [employer] Information     [employer] lets you send messages to your doctor, view your test results, renew your prescriptions, schedule appointments and more. To sign up, go to [email or web address] . Click on \"Log in\" on the left side of the screen, which will take you to the Welcome page. Then click on \"Sign up Now\" on the right side of the page.     You will be asked to enter the access code listed below, as well as some personal information. Please " follow the directions to create your username and password.     Your access code is: [email or web address]  Expires: 2017  4:17 PM     Your access code will  in 90 days. If you need help or a new code, please call your [place] clinic or [phone].        Care EveryWhere ID     This is your Care EveryWhere ID. This could be used by other organizations to access your [place] medical records  [id number]        Your Vitals Were     Pulse Temperature Pulse Oximetry BMI (Body Mass Index)          71 97.7  F (36.5  C) (Oral) 93% 29.51 kg/m2         Blood Pressure from Last 3 Encounters:   17 156/70   17 138/90   17 158/58    Weight from Last 3 Encounters:   17 166 lb 9.6 oz (75.6 kg)   17 168 lb (76.2 kg)   17 179 lb (81.2 kg)              We Performed the Following     Wet prep          Today's Medication Changes          These changes are accurate as of: 17  4:22 PM.  If you have any questions, ask your nurse or doctor.               Start taking these medicines.        Dose/Directions    cetirizine 10 MG tablet   Commonly known as:  zyrTEC   Used for:  Allergic reaction, initial encounter   Started by:  Agata Moon PA-C        Dose:  10 mg   Take 1 tablet (10 mg) by mouth every evening   Quantity:  30 tablet   Refills:  0       nystatin 617108 UNIT/GM Powd   Commonly known as:  MYCOSTATIN   Used for:  Intertrigo   Started by:  Agata Moon PA-C        Apply topically 3 times daily as needed   Quantity:  60 g   Refills:  1       terconazole 0.4 % cream   Commonly known as:  TERAZOL 7   Used for:  Vaginal itching   Started by:  Agata Moon PA-C        Dose:  1 applicator   Place 1 applicator vaginally At Bedtime   Quantity:  45 g   Refills:  0            Where to get your medicines      [facility] [address] [place] [address] [address] AT [facility] &   [address],  [facility] [address]     Phone:  [phone]     cetirizine 10 MG tablet    nystatin 856153 UNIT/GM Powd    terconazole 0.4 % cream                Primary Care Provider Office Phone # Fax #    Александр Michael Lopez [phone]       [facility] [address]  [facility] [address]        Thank you!     Thank you for choosing [facility]  for your care. Our goal is always to provide you with excellent care. Hearing back from our patients is one way we can continue to improve our services. Please take a few minutes to complete the written survey that you may receive in the mail after your visit with us. Thank you!             Your Updated Medication List - Protect others around you: Learn how to safely use, store and throw away your medicines at www.disposemymeds.org.          This list is accurate as of: 4/18/17  4:22 PM.  Always use your most recent med list.                   Brand Name Dispense Instructions for use    ascorbic acid 500 MG tablet    VITAMIN C    30 tablet    Take 1 tablet (500 mg) by mouth daily       blood glucose monitoring test strip    ONE TOUCH ULTRA    100 each    Use to test blood sugars 1-2 times daily or as directed.       calcium + D 600-200 MG-UNIT Tabs   Generic drug:  calcium carbonate-vitamin D      Take 1 tablet by mouth daily.       CENTRUM SILVER per tablet      Take 1 tablet by mouth daily.       cetirizine 10 MG tablet    zyrTEC    30 tablet    Take 1 tablet (10 mg) by mouth every evening       ciprofloxacin 250 MG tablet    CIPRO    20 tablet    Take 1 tablet (250 mg) by mouth 2 times daily for 10 days       digoxin 125 MCG tablet    LANOXIN    90 tablet    Take 1 tablet (125 mcg) by mouth daily       diltiazem 240 MG 24 hr capsule    DILACOR XR    90 capsule    Take 1 capsule (240 mg) by mouth daily       furosemide 20 MG tablet    LASIX    90 tablet    Take 1 tablet (20 mg) by mouth daily as needed       gabapentin  300 MG capsule    NEURONTIN    90 capsule    Take 1 capsule (300 mg) by mouth At Bedtime       metFORMIN 500 MG tablet    GLUCOPHAGE    180 tablet    Take 1 tablet (500 mg) by mouth 2 times daily (with meals)       nystatin 628813 UNIT/GM Powd    MYCOSTATIN    60 g    Apply topically 3 times daily as needed       potassium chloride SA 10 MEQ CR tablet    K-DUR/KLOR-CON M    90 tablet    Take 1 tablet (10 mEq) by mouth daily On days that furosemide is taken       rivaroxaban ANTICOAGULANT 15 MG Tabs tablet    XARELTO    30 tablet    Take 1 tablet (15 mg) by mouth daily (with dinner)       terconazole 0.4 % cream    TERAZOL 7    45 g    Place 1 applicator vaginally At Bedtime       traMADol 50 MG tablet    ULTRAM    30 tablet    Take 1 tablet (50 mg) by mouth every 6 hours as needed for moderate pain

## 2017-04-18 NOTE — PROGRESS NOTES
"SUBJECTIVE:  Trina Azevedo is a 89 year old female who presents to the clinic today for  1) vaginal itching for the past few days.    2) itching of skin on stomach in skin folds, for \"some time\"  3) generalized itchy skin for the past 5 days or so, since starting Cipro.  Denies any shortness of breath or difficulty swallowing.      SH: she is here with her daughter today.     Past Medical History:   Diagnosis Date     Acute pancreatitis 1986    gallstone induced     Atrial fibrillation (H) 2001    paroxysmal, stress thallium 2005- normal.  Patient refused warfarin, now on Xarelto     Essential hypertension, benign      Restless legs syndrome      Rubella without mention of complication      Stroke (H) 12/2010     Type 2 diabetes, HbA1C goal < 8% (H) 2008     Unspecified sinusitis (chronic)      Unspecified venous (peripheral) insufficiency         Allergies   Allergen Reactions     Mucinex      Felt jittery-possibly just Mucinex- D due to the pseudoephedrine component     Vicodin [Hydrocodone-Acetaminophen] GI Disturbance     Pt tolerates acetaminophen  Pt states that \"Percodent\" gives her hallucinations     Social History   Substance Use Topics     Smoking status: Former Smoker     Smokeless tobacco: Never Used      Comment: quit in 1985     Alcohol use No      Comment: hx of pancreatitis       ROS:  CONSTITUTIONAL:NEGATIVE for fever, chills, change in weight  INTEGUMENTARY/SKIN: as per HPI  EYES: NEGATIVE for vision changes or irritation  ENT/MOUTH: NEGATIVE for ear, mouth and throat problems  RESP:NEGATIVE for significant cough or SOB  GI: NEGATIVE for nausea, abdominal pain, heartburn, or change in bowel habits  : as per HPI    EXAM:   /70 (BP Location: Left arm, Patient Position: Chair, Cuff Size: Adult Regular)  Pulse 71  Temp 97.7  F (36.5  C) (Oral)  Wt 166 lb 9.6 oz (75.6 kg)  SpO2 93%  BMI 29.51 kg/m2  GENERAL: alert, no acute distress.  SKIN: abdomen: erythema in skin folds with satellite " lesions, no vesicles.  No drainage  No other rash  : scant clear vaginal discharge.  Labia are erythematous without rash or lesions.      (L29.8) Vaginal itching  (primary encounter diagnosis)  Comment:   Plan: Wet prep, terconazole (TERAZOL 7) 0.4 % cream            (T78.40XA) Allergic reaction, initial encounter  Comment: likely secondary to Cipro.  Stop Cipro.   Plan: cetirizine (ZYRTEC) 10 MG tablet            (L30.4) Intertrigo  Comment:   Plan: nystatin (MYCOSTATIN) [id number] UNIT/GM POWD        Keep area cool and dry.      Follow up with PCP for re-check within 2 weeks, sooner should symptoms persist or worsen.    Patient expresses understanding and agreement with the assessment and plan as above.

## 2017-04-18 NOTE — NURSING NOTE
"Chief Complaint   Patient presents with     Vaginal Problem     Vaginal itiching and redness     Constipation     x 5 days      Urinary Problem     Discomfort with urination. Pt is still using her Cipro that was prescribed on 04/11/17       Initial /70 (BP Location: Left arm, Patient Position: Chair, Cuff Size: Adult Regular)  Pulse 71  Temp 97.7  F (36.5  C) (Oral)  Wt 166 lb 9.6 oz (75.6 kg)  SpO2 93%  BMI 29.51 kg/m2 Estimated body mass index is 29.51 kg/(m^2) as calculated from the following:    Height as of 2/3/17: 5' 3\" (1.6 m).    Weight as of this encounter: 166 lb 9.6 oz (75.6 kg).  Medication Reconciliation: complete    "

## 2017-04-24 NOTE — PROGRESS NOTES
"  SUBJECTIVE:                                                    Trina Azevedo is a 89 year old female who presents to clinic today for the following health issues:      ED/UC Followup:    Facility:  [facility]  Date of visit: 04/18 and 04/11  Reason for visit: Painful in Groin, very raw  Current Status: no more pain     Patient seen in UC for vaginitis, and groin itching and pain on 4/18/17. See Epic for details   She was treated with Terazol topically and nystatin powder. Skin is much better. She has also changed to a cotton underwear too.   Feeling much better in regards to the vaginal rash and itchy.     -------------------------------------    Problem list and histories reviewed & adjusted, as indicated.  Additional history: as documented    Labs reviewed in EPIC    Reviewed and updated as needed this visit by clinical staff  Tobacco  Allergies       Reviewed and updated as needed this visit by Provider         ROS:  Constitutional,  cardiovascular, pulmonary, gi and gu systems are negative, except as otherwise noted.    OBJECTIVE:                                                    /72 (BP Location: Left arm, Patient Position: Chair, Cuff Size: Adult Large)  Pulse 74  Temp 98.7  F (37.1  C) (Oral)  Ht 5' 3\" (1.6 m)  Wt 166 lb (75.3 kg)  SpO2 94%  BMI 29.41 kg/m2  Body mass index is 29.41 kg/(m^2).  GENERAL: healthy, alert and no distress  MS: no gross musculoskeletal defects noted, no edema  SKIN: external genital region, pannus, Minimal redness noted. Skin appears to be healing well.   No bright red erythema, no open areas.       Diagnostic Test Results:  none      ASSESSMENT/PLAN:                                                            1. Candidal intertrigo  Continue with nystatin powder for now.   Then may change to a gold bond type powder to keep with folds dry as needed.    Recheck prn     See PCP as needed.             Michelle Daley PA-C  [facility]    "

## 2017-04-24 NOTE — MR AVS SNAPSHOT
"              After Visit Summary   4/24/2017    Trina Azevedo    MRN: [id number]           Patient Information     Date Of Birth          3/2/1923        Visit Information        Provider Department      4/24/2017 9:00 AM Michelle Daley PA-C [facility]        Today's Diagnoses     Candidal intertrigo    -  1       Follow-ups after your visit        Your next 10 appointments already scheduled     May 09, 2017  2:15 PM CDT   Treatment 45 with Carol Rodriguez, SLP   [facility] ([facility])    [address] [id number] [phone]              Who to contact     If you have questions or need follow up information about today's clinic visit or your schedule please contact [facility] directly at [phone].  Normal or non-critical lab and imaging results will be communicated to you by MyChart, letter or phone within 4 business days after the clinic has received the results. If you do not hear from us within 7 days, please contact the clinic through MyChart or phone. If you have a critical or abnormal lab result, we will notify you by phone as soon as possible.  Submit refill requests through Holidog or call your pharmacy and they will forward the refill request to us. Please allow 3 business days for your refill to be completed.          Additional Information About Your Visit        MyChart Information     Holidog lets you send messages to your doctor, view your test results, renew your prescriptions, schedule appointments and more. To sign up, go to [email or web address] . Click on \"Log in\" on the left side of the screen, which will take you to the Welcome page. Then click on \"Sign up Now\" on the right side of the page.     You will be asked to enter the access code listed below, as well as some personal information. Please follow the directions to create your username and password.     Your " "access code is: [email or web address]  Expires: 2017  4:17 PM     Your access code will  in 90 days. If you need help or a new code, please call your [facility] or [phone].        Care EveryWhere ID     This is your Care EveryWhere ID. This could be used by other organizations to access your [place] medical records  [id number]        Your Vitals Were     Pulse Temperature Height Pulse Oximetry BMI (Body Mass Index)       74 98.7  F (37.1  C) (Oral) 5' 3\" (1.6 m) 94% 29.41 kg/m2        Blood Pressure from Last 3 Encounters:   17 176/72   17 156/70   17 138/90    Weight from Last 3 Encounters:   17 166 lb (75.3 kg)   17 166 lb 9.6 oz (75.6 kg)   17 168 lb (76.2 kg)              Today, you had the following     No orders found for display       Primary Care Provider Office Phone # Fax #    Александр Lopez [phone]       [facility] [address] [address]        Thank you!     Thank you for choosing [facility]  for your care. Our goal is always to provide you with excellent care. Hearing back from our patients is one way we can continue to improve our services. Please take a few minutes to complete the written survey that you may receive in the mail after your visit with us. Thank you!             Your Updated Medication List - Protect others around you: Learn how to safely use, store and throw away your medicines at www.disposemymeds.org.          This list is accurate as of: 17  9:32 AM.  Always use your most recent med list.                   Brand Name Dispense Instructions for use    ascorbic acid 500 MG tablet    VITAMIN C    30 tablet    Take 1 tablet (500 mg) by mouth daily       blood glucose monitoring test strip    ONE TOUCH ULTRA    100 each    Use to test blood sugars 1-2 times daily or as directed.       calcium + D 600-200 MG-UNIT Tabs   Generic drug:  calcium " carbonate-vitamin D      Take 1 tablet by mouth daily.       CENTRUM SILVER per tablet      Take 1 tablet by mouth daily.       cetirizine 10 MG tablet    zyrTEC    30 tablet    Take 1 tablet (10 mg) by mouth every evening       digoxin 125 MCG tablet    LANOXIN    90 tablet    Take 1 tablet (125 mcg) by mouth daily       diltiazem 240 MG 24 hr capsule    DILACOR XR    90 capsule    Take 1 capsule (240 mg) by mouth daily       furosemide 20 MG tablet    LASIX    90 tablet    Take 1 tablet (20 mg) by mouth daily as needed       gabapentin 300 MG capsule    NEURONTIN    90 capsule    Take 1 capsule (300 mg) by mouth At Bedtime       metFORMIN 500 MG tablet    GLUCOPHAGE    180 tablet    Take 1 tablet (500 mg) by mouth 2 times daily (with meals)       nystatin [id number] UNIT/GM Powd    MYCOSTATIN    60 g    Apply topically 3 times daily as needed       potassium chloride SA 10 MEQ CR tablet    K-DUR/KLOR-CON M    90 tablet    Take 1 tablet (10 mEq) by mouth daily On days that furosemide is taken       rivaroxaban ANTICOAGULANT 15 MG Tabs tablet    XARELTO    30 tablet    Take 1 tablet (15 mg) by mouth daily (with dinner)       terconazole 0.4 % cream    TERAZOL 7    45 g    Place 1 applicator vaginally At Bedtime       traMADol 50 MG tablet    ULTRAM    30 tablet    Take 1 tablet (50 mg) by mouth every 6 hours as needed for moderate pain

## 2017-04-24 NOTE — NURSING NOTE
"Chief Complaint   Patient presents with     Hospital F/U     04/18       Initial /72 (BP Location: Left arm, Patient Position: Chair, Cuff Size: Adult Large)  Pulse 74  Temp 98.7  F (37.1  C) (Oral)  Ht 5' 3\" (1.6 m)  Wt 166 lb (75.3 kg)  SpO2 94%  BMI 29.41 kg/m2 Estimated body mass index is 29.41 kg/(m^2) as calculated from the following:    Height as of this encounter: 5' 3\" (1.6 m).    Weight as of this encounter: 166 lb (75.3 kg).  Medication Reconciliation: complete    "

## 2017-07-29 NOTE — PROGRESS NOTES
"SUBJECTIVE:Trina Azevedo is a 89 year old female who presents with bilateral ear waxfor day(s).   Severity: mild   Timing:still present  Additional symptoms include none.    History of recurrent otitis: no    Past Medical History:   Diagnosis Date     Acute pancreatitis 1986    gallstone induced     Atrial fibrillation (H) 2001    paroxysmal, stress thallium 2005- normal.  Patient refused warfarin, now on Xarelto     Essential hypertension, benign      Restless legs syndrome      Rubella without mention of complication      Stroke (H) 12/2010     Type 2 diabetes, HbA1C goal < 8% (H) 2008     Unspecified sinusitis (chronic)      Unspecified venous (peripheral) insufficiency      Allergies   Allergen Reactions     Ciprofloxacin Itching     Mucinex      Felt jittery-possibly just Mucinex- D due to the pseudoephedrine component     Vicodin [Hydrocodone-Acetaminophen] GI Disturbance     Pt tolerates acetaminophen  Pt states that \"Percodent\" gives her hallucinations     Social History   Substance Use Topics     Smoking status: Former Smoker     Smokeless tobacco: Never Used      Comment: quit in 1985     Alcohol use No      Comment: hx of pancreatitis       ROS: CONSTITUTIONAL:NEGATIVE for fever, chills, change in weight    OBJECTIVE:  /63 (BP Location: Right arm, Patient Position: Chair, Cuff Size: Adult Regular)  Pulse 68  Temp 97  F (36.1  C) (Oral)  Wt 164 lb (74.4 kg)  BMI 29.05 kg/m2   The right TM is normal: no effusions, no erythema, and normal landmarks     The right auditory canal is obstructed with cerumen  The left TM is normal: no effusions, no erythema, and normal landmarks  The left auditory canal is obstructed with cerumen    Ear wash, clear after      ICD-10-CM    1. Bilateral impacted cerumen H61.23 Nursing Communication 1       F/U PCP/IM/FP/UC if worse, not any better    "

## 2017-07-29 NOTE — NURSING NOTE
"Chief Complaint   Patient presents with     Ear Problem     Patient was seen at [facility] and told to come have her ears irrigated       Initial /63 (BP Location: Right arm, Patient Position: Chair, Cuff Size: Adult Regular)  Pulse 68  Temp 97  F (36.1  C) (Oral)  Wt 164 lb (74.4 kg)  BMI 29.05 kg/m2 Estimated body mass index is 29.05 kg/(m^2) as calculated from the following:    Height as of 4/24/17: 5' 3\" (1.6 m).    Weight as of this encounter: 164 lb (74.4 kg).  Medication Reconciliation: complete    "

## 2017-07-29 NOTE — MR AVS SNAPSHOT
"              After Visit Summary   2017    Trina Azevedo    MRN: [id number]           Patient Information     Date Of Birth          3/2/1923        Visit Information        Provider Department      2017 9:05 AM Tan Rose, DO [facility]        Today's Diagnoses     Bilateral impacted cerumen    -  1       Follow-ups after your visit        Who to contact     If you have questions or need follow up information about today's clinic visit or your schedule please contact [facility] directly at [phone].  Normal or non-critical lab and imaging results will be communicated to you by Intenthart, letter or phone within 4 business days after the clinic has received the results. If you do not hear from us within 7 days, please contact the clinic through Intenthart or phone. If you have a critical or abnormal lab result, we will notify you by phone as soon as possible.  Submit refill requests through Innovari or call your pharmacy and they will forward the refill request to us. Please allow 3 business days for your refill to be completed.          Additional Information About Your Visit        MyChart Information     Innovari lets you send messages to your doctor, view your test results, renew your prescriptions, schedule appointments and more. To sign up, go to [email or web address] . Click on \"Log in\" on the left side of the screen, which will take you to the Welcome page. Then click on \"Sign up Now\" on the right side of the page.     You will be asked to enter the access code listed below, as well as some personal information. Please follow the directions to create your username and password.     Your access code is: [email or web address]  Expires: 10/27/2017  9:56 AM     Your access code will  in 90 days. If you need help or a new code, please call your [place] clinic or [phone].        Care EveryWhere ID     This is your Care EveryWhere ID. This " could be used by other organizations to access your [place] medical records  [id number]        Your Vitals Were     Pulse Temperature BMI (Body Mass Index)             68 97  F (36.1  C) (Oral) 29.05 kg/m2          Blood Pressure from Last 3 Encounters:   07/29/17 162/63   04/24/17 176/72   04/18/17 156/70    Weight from Last 3 Encounters:   07/29/17 164 lb (74.4 kg)   04/24/17 166 lb (75.3 kg)   04/18/17 166 lb 9.6 oz (75.6 kg)              We Performed the Following     Nursing Communication 1        Primary Care Provider Office Phone # Fax #    Александр Lopez [phone]       [facility] [address] [address]        Equal Access to Services     CECILY BOSTON : Hadii liliya garcia hadasho Soomaali, waaxda luqadaha, qaybta kaalmada adeegyada, christian wiggins . So [facility] [phone].    ATENCIÓN: Si habla español, tiene a magdaleno disposición servicios gratuitos de asistencia lingüística. Ilia al [phone].    We comply with applicable federal civil rights laws and [place] laws. We do not discriminate on the basis of race, color, national origin, age, disability sex, sexual orientation or gender identity.            Thank you!     Thank you for choosing [facility]  for your care. Our goal is always to provide you with excellent care. Hearing back from our patients is one way we can continue to improve our services. Please take a few minutes to complete the written survey that you may receive in the mail after your visit with us. Thank you!             Your Updated Medication List - Protect others around you: Learn how to safely use, store and throw away your medicines at www.disposemymeds.org.          This list is accurate as of: 7/29/17  9:56 AM.  Always use your most recent med list.                   Brand Name Dispense Instructions for use Diagnosis    ascorbic acid 500 MG tablet    VITAMIN C    30 tablet    Take  1 tablet (500 mg) by mouth daily        blood glucose monitoring test strip    ONE TOUCH ULTRA    100 each    Use to test blood sugars 1-2 times daily or as directed.    Essential hypertension, benign       calcium + D 600-200 MG-UNIT Tabs   Generic drug:  calcium carbonate-vitamin D      Take 1 tablet by mouth daily.        CENTRUM SILVER per tablet      Take 1 tablet by mouth daily.        cetirizine 10 MG tablet    zyrTEC    30 tablet    Take 1 tablet (10 mg) by mouth every evening    Allergic reaction, initial encounter       digoxin 125 MCG tablet    LANOXIN    90 tablet    Take 1 tablet (125 mcg) by mouth daily    Chronic atrial fibrillation (H)       diltiazem 240 MG 24 hr capsule    DILACOR XR    90 capsule    Take 1 capsule (240 mg) by mouth daily    Essential hypertension, benign       furosemide 20 MG tablet    LASIX    90 tablet    Take 1 tablet (20 mg) by mouth daily as needed    Edema       gabapentin 300 MG capsule    NEURONTIN    90 capsule    Take 1 capsule (300 mg) by mouth At Bedtime    Neuropathy (H), Restless legs syndrome       metFORMIN 500 MG tablet    GLUCOPHAGE    180 tablet    Take 1 tablet (500 mg) by mouth 2 times daily (with meals)    Type 2 diabetes mellitus with diabetic neuropathy, without long-term current use of insulin (H)       potassium chloride SA 10 MEQ CR tablet    K-DUR/KLOR-CON M    90 tablet    Take 1 tablet (10 mEq) by mouth daily On days that furosemide is taken    Hypopotassemia       rivaroxaban ANTICOAGULANT 15 MG Tabs tablet    XARELTO    30 tablet    Take 1 tablet (15 mg) by mouth daily (with dinner)    Atrial fibrillation (H), 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       terconazole 0.4 % cream    TERAZOL 7    45 g    Place 1 applicator vaginally At Bedtime    Vaginal itching       traMADol 50 MG tablet    ULTRAM    30 tablet    Take 1 tablet (50 mg) by mouth every 6 hours as needed for moderate pain    Primary localized  osteoarthrosis, lower leg, left

## 2017-09-07 NOTE — PROGRESS NOTES
SUBJECTIVE:   Trina Azevedo is a 89 year old female who presents to clinic today for the following health issues:      Diabetes Follow-up    Patient is checking blood sugars: once daily.  Results are as follows:       am - 313 this am at 9am. Patient states she had no food prior but drank apple juice. Average am reading is around 200        Diabetic concerns: None and blood sugar frequently over 200     Symptoms of hypoglycemia (low blood sugar): none     Paresthesias (numbness or burning in feet) or sores: Yes in the hands as well   Date of last diabetic eye exam: two weeks ago      Amount of exercise or physical activity: None    Problems taking medications regularly: No    Medication side effects: none  Diet: diabetic      Patient also presents today with sob, and thick phlegm that she is coughing up. These symptoms along with constipation have been present for about 3 weeks. Patient would like a chest x-ray today. Patient has also experiencing fatigue, as well as pain in the feet that radiates up the legs which is the result of neuropathy per patient. Patient states the pain in her knees is becoming such a problem that she is unable to do much of her daily activity. Patient is also having similar pain in the hands. Patient states her glucose levels have been very high per patient and patients daughter.     Problem list and histories reviewed & adjusted, as indicated.  Additional history: as documented        Reviewed and updated as needed this visit by clinical staffTobacco  Allergies  Med Hx  Surg Hx  Fam Hx  Soc Hx      Reviewed and updated as needed this visit by Provider         ROS:  Constitutional, HEENT, cardiovascular, pulmonary, gi and gu systems are negative, except as otherwise noted.      OBJECTIVE:   /64 (BP Location: Left arm, Patient Position: Chair, Cuff Size: Adult Regular)  Pulse 65  Temp 98.3  F (36.8  C) (Oral)  Wt 168 lb (76.2 kg)  SpO2 (!) 86%  BMI 29.76 kg/m2  Body mass  index is 29.76 kg/(m^2).  NECK: no adenopathy, no asymmetry, masses, or scars and thyroid normal to palpation  RESP: Diffuse rhonchi  CV: regular rate and rhythm, normal S1 S2, no S3 or S4, no murmur, click or rub, no peripheral edema and peripheral pulses strong  ABDOMEN: soft, nontender, no hepatosplenomegaly, no masses and bowel sounds normal  MS: no gross musculoskeletal defects noted, no edema        ASSESSMENT/PLAN:     1. Type 2 diabetes mellitus with diabetic neuropathy, without long-term current use of insulin (H)    - metFORMIN (GLUCOPHAGE) 500 MG tablet; Take 1 tablet (500 mg) by mouth 2 times daily (with meals) (Patient taking differently: Take 500 mg by mouth daily (with dinner) )  Dispense: 180 tablet; Refill: 3  - Hemoglobin A1c    2. Neuropathy (H)    - gabapentin (NEURONTIN) 300 MG capsule; Take 1 capsule (300 mg) by mouth At Bedtime  Dispense: 90 capsule; Refill: 3    3. Restless legs syndrome      4. Chronic atrial fibrillation (H)    - digoxin (LANOXIN) 125 MCG tablet; Take 1 tablet (125 mcg) by mouth daily  Dispense: 90 tablet; Refill: 3  - diltiazem (DILACOR XR) 240 MG 24 hr capsule; Take 1 capsule (240 mg) by mouth daily  Dispense: 90 capsule; Refill: 3    5. BENIGN HYPERTENSION      6. Cough    - XR Chest 2 Views; Future    7. Fatigue, unspecified type    - CBC with platelets  - Basic metabolic panel  (Ca, Cl, CO2, Creat, Gluc, K, Na, BUN)  - UA with Microscopic reflex to Culture; Future    8. Pneumonia of right lower lobe due to infectious organism (H)  Elevated WBC - CXR today shows possible RLL infiltrate with associated effusion.  Will try outpatient Augmentin given possibility of aspiration.  Follow closely  - amoxicillin-clavulanate (AUGMENTIN) 875-125 MG per tablet; Take 1 tablet by mouth daily  Dispense: 10 tablet; Refill: 0        Александр Lopez MD  [facility]

## 2017-09-07 NOTE — MR AVS SNAPSHOT
"              After Visit Summary   9/7/2017    Trina Azevedo    MRN: [id number]           Patient Information     Date Of Birth          3/2/1923        Visit Information        Provider Department      9/7/2017 3:00 PM Александр Lopez MD [facility]        Today's Diagnoses     Cough    -  1    Type 2 diabetes mellitus with diabetic neuropathy, without long-term current use of insulin (H)        Neuropathy (H)        Restless legs syndrome        Chronic atrial fibrillation (H)        BENIGN HYPERTENSION        Fatigue, unspecified type        Pneumonia of right lower lobe due to infectious organism (H)          Care Instructions    - Start taking Augmentin once daily for 10 days.  (Prescription has been sent to your pharmacy)  - Continue all other medications as you have been.    - Follow-up with me in 3-4 weeks.             Follow-ups after your visit        Who to contact     If you have questions or need follow up information about today's clinic visit or your schedule please contact [facility] directly at [phone].  Normal or non-critical lab and imaging results will be communicated to you by [employer], letter or phone within 4 business days after the clinic has received the results. If you do not hear from us within 7 days, please contact the clinic through [employer] or phone. If you have a critical or abnormal lab result, we will notify you by phone as soon as possible.  Submit refill requests through [employer] or call your pharmacy and they will forward the refill request to us. Please allow 3 business days for your refill to be completed.          Additional Information About Your Visit        Med Accesshart Information     [employer] lets you send messages to your doctor, view your test results, renew your prescriptions, schedule appointments and more. To sign up, go to [email or web address] . Click on \"Log in\" on the left side of the screen, which will take " "you to the Welcome page. Then click on \"Sign up Now\" on the right side of the page.     You will be asked to enter the access code listed below, as well as some personal information. Please follow the directions to create your username and password.     Your access code is: [email or web address]  Expires: 10/27/2017  9:56 AM     Your access code will  in 90 days. If you need help or a new code, please call your [facility] clinic or [phone].        Care EveryWhere ID     This is your Care EveryWhere ID. This could be used by other organizations to access your [facility] medical records  [id number]        Your Vitals Were     Pulse Temperature Pulse Oximetry BMI (Body Mass Index)          65 98.3  F (36.8  C) (Oral) 86% 29.76 kg/m2         Blood Pressure from Last 3 Encounters:   17 132/64   17 162/63   17 176/72    Weight from Last 3 Encounters:   17 168 lb (76.2 kg)   17 164 lb (74.4 kg)   17 166 lb (75.3 kg)              We Performed the Following     Basic metabolic panel  (Ca, Cl, CO2, Creat, Gluc, K, Na, BUN)     CBC with platelets     Hemoglobin A1c     UA with Microscopic reflex to Culture          Today's Medication Changes          These changes are accurate as of: 17  4:26 PM.  If you have any questions, ask your nurse or doctor.               Start taking these medicines.        Dose/Directions    amoxicillin-clavulanate 875-125 MG per tablet   Commonly known as:  AUGMENTIN   Used for:  Pneumonia of right lower lobe due to infectious organism (H)   Started by:  Александр Lopez MD        Dose:  1 tablet   Take 1 tablet by mouth daily   Quantity:  10 tablet   Refills:  0            Where to get your medicines      [facility] [id number] [place] [address] [address] [facility] [address] [facility] [address]     Phone:  [phone]     amoxicillin-clavulanate 875-125 MG per tablet    " digoxin 125 MCG tablet    diltiazem 240 MG 24 hr capsule    gabapentin 300 MG capsule    metFORMIN 500 MG tablet                Primary Care Provider Office Phone # Fax #    Александр Lopez [phone] [address] [address]        Equal Access to Services     CECILY BOSTON : Hadii liliya ku hadasho Soomaali, waaxda luqadaha, qaybta kaalmada adeegyada, waxay janell bettybruno davalos alenaleonarda quiroz. So [facility] [phone].    ATENCIÓN: Si habla español, tiene a magdaleno disposición servicios gratuitos de asistencia lingüística. Llame al [phone].    We comply with applicable federal civil rights laws and [place] laws. We do not discriminate on the basis of race, color, national origin, age, disability sex, sexual orientation or gender identity.            Thank you!     Thank you for choosing [facility]  for your care. Our goal is always to provide you with excellent care. Hearing back from our patients is one way we can continue to improve our services. Please take a few minutes to complete the written survey that you may receive in the mail after your visit with us. Thank you!             Your Updated Medication List - Protect others around you: Learn how to safely use, store and throw away your medicines at www.disposemymeds.org.          This list is accurate as of: 9/7/17  4:26 PM.  Always use your most recent med list.                   Brand Name Dispense Instructions for use Diagnosis    amoxicillin-clavulanate 875-125 MG per tablet    AUGMENTIN    10 tablet    Take 1 tablet by mouth daily    Pneumonia of right lower lobe due to infectious organism (H)       ascorbic acid 500 MG tablet    VITAMIN C    30 tablet    Take 1 tablet (500 mg) by mouth daily        blood glucose monitoring test strip    ONE TOUCH ULTRA    100 each    Use to test blood sugars 1-2 times daily or as directed.    Essential hypertension, benign       calcium + D 600-200 MG-UNIT  Tabs   Generic drug:  calcium carbonate-vitamin D      Take 1 tablet by mouth daily.        CENTRUM SILVER per tablet      Take 1 tablet by mouth daily.        cetirizine 10 MG tablet    zyrTEC    30 tablet    Take 1 tablet (10 mg) by mouth every evening    Allergic reaction, initial encounter       digoxin 125 MCG tablet    LANOXIN    90 tablet    Take 1 tablet (125 mcg) by mouth daily    Chronic atrial fibrillation (H)       diltiazem 240 MG 24 hr capsule    DILACOR XR    90 capsule    Take 1 capsule (240 mg) by mouth daily    Chronic atrial fibrillation (H)       furosemide 20 MG tablet    LASIX    90 tablet    Take 1 tablet (20 mg) by mouth daily as needed    Edema       gabapentin 300 MG capsule    NEURONTIN    90 capsule    Take 1 capsule (300 mg) by mouth At Bedtime    Neuropathy (H)       metFORMIN 500 MG tablet    GLUCOPHAGE    180 tablet    Take 1 tablet (500 mg) by mouth 2 times daily (with meals)    Type 2 diabetes mellitus with diabetic neuropathy, without long-term current use of insulin (H)       potassium chloride SA 10 MEQ CR tablet    K-DUR/KLOR-CON M    90 tablet    Take 1 tablet (10 mEq) by mouth daily On days that furosemide is taken    Hypopotassemia       rivaroxaban ANTICOAGULANT 15 MG Tabs tablet    XARELTO    30 tablet    Take 1 tablet (15 mg) by mouth daily (with dinner)    Atrial fibrillation (H), 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

## 2017-09-07 NOTE — NURSING NOTE
"Chief Complaint   Patient presents with     Diabetes     Breathing Problem       Initial /64 (BP Location: Left arm, Patient Position: Chair, Cuff Size: Adult Regular)  Pulse 65  Temp 98.3  F (36.8  C) (Oral)  Wt 168 lb (76.2 kg)  SpO2 (!) 86%  BMI 29.76 kg/m2 Estimated body mass index is 29.76 kg/(m^2) as calculated from the following:    Height as of 4/24/17: 5' 3\" (1.6 m).    Weight as of this encounter: 168 lb (76.2 kg).  Medication Reconciliation: complete  "

## 2017-09-07 NOTE — PATIENT INSTRUCTIONS
- Start taking Augmentin once daily for 10 days.  (Prescription has been sent to your pharmacy)  - Continue all other medications as you have been.    - Follow-up with me in 3-4 weeks.

## 2017-09-12 PROBLEM — J15.9 COMMUNITY ACQUIRED BACTERIAL PNEUMONIA: Status: ACTIVE | Noted: 2017-01-01

## 2017-09-12 NOTE — ED NOTES
Bed: ED07  Expected date:   Expected time:   Means of arrival:   Comments:  514  89 M rule out pneumonia/SOB  1200

## 2017-09-12 NOTE — PROGRESS NOTES
09/12/17 1637   General Information   Onset Date 09/12/17   Start of Care Date 09/12/17   Referring Physician Dr. Daiana Pond   Patient Profile Review/OT: Additional Occupational Profile Info See Profile for full history and prior level of function   Patient/Family Goals Statement To eat and drink tonight.  Patient/family agreed to POC goals.   Swallowing Evaluation Bedside swallow evaluation   Behaviorial Observations Alert   Mode of current nutrition NPO   Respiratory Status O2 Supply   Type of O2 supply Nasal cannula  (stable)   Comments Per MD note: Trina Azevedo is a 89 year old female with hx of chronic a-fib on chronic anticoagulation with warfarin, aortic stenosis, HTN, DM2, and prior CVA who presents with progressive shortness of breath and generalized weakness, and is being admitted for acute hypoxic respiratory failure due to pneumonia.      OP video swallow study completed in 2/2017.  Report: Patient presents with mild oral-pharyngeal dysphagia per study results.  Deficits include decreased rotary mastication, oral residue, weak base of tongue function, delayed swallow reflex, mild-minimal decreased epiglottic inversion, and tight UES.  Deficits resulted in mild-min to mild-moderate pharygneal residue, bolus under/around the epiglottis at times, moderate penetration with large sip of nectar thick liquids by cup, and mod-deep penetration with thin liquids by cup.  Patient had difficulty coordinating a chin tuck initially with penetration observed during intial trials.  Patient was able to coordinate the chin tuck with thin liquids by cup to prevent penetration during trial 14 during the study.  Recommend a dysphagia diet level 3 and thin liquids with safe swallow strategies including: sit up at 90 degrees, remain upright for 30-60 minutes after eating, small bites/sips, chin tuck with small sips of thin liquids, 2 swallows per trial, alternate textures.  Swallow Tx with education was provided  after the study.  Recommend continued OP swallow Tx to train strategies including the chin tuck, insure diet tolerance/advance to regular solids as indicated, and complete swallow Tx exercises.  Patient and family verbalized understanding of education provided in swallow Tx.  OP clinic to contact patient/family to schedule follow up OP clinical swallow Tx.      Family reports patient with increased difficulty with pills over the last week, increased coughing on phlegm ove the past few weeks; patient has continued a soft diet and uses a chin tuck with thin liquids at home.   Clinical Swallow Evaluation   Oral Musculature generally intact   Dentition upper dentures;lower dentures  (partial lower)   Laryngeal Function Dry swallow palpated;Voicing initiated;Swallow;Cough;Throat clear   Clinical Swallow Eval: Thin Liquid Texture Trial   Mode of Presentation, Thin Liquids cup   Volume of Liquid or Food Presented sips x 8   Oral Phase of Swallow Premature pharyngeal entry   Pharyngeal Phase of Swallow (delay)   Diagnostic Statement wet voice noted x 2 despite use of the chin tuck   Clinical Swallow Eval: Nectar Thick Liquid Texture Trial   Mode of Presentation, Nectar spoon   Volume of Nectar Presented tsps x 6   Oral Phase, Nectar Premature pharyngeal entry   Pharyngeal Phase, Nectar repeated swallows  (delay)   Diagnostic Statement no signs of aspiration   Clinical Swallow Eval: Puree Solid Texture Trial   Mode of Presentation, Puree spoon   Volume of Puree Presented tsps x 3   Oral Phase, Puree Premature pharyngeal entry   Pharyngeal Phase, Puree repeated swallows  (delay)   Diagnostic Statement no signs of aspiration   Clinical Swallow Eval: Solid Food Texture Trial   Mode of Presentation, Solid spoon   Volume of Solid Food Presented soft solids x 3   Oral Phase, Solid Premature pharyngeal entry  (min residue)   Pharyngeal Phase, Solid repeated swallows  (delay)   Diagnostic Statement no signs of aspiration;  alternating to nectar liquids used   Swallow Compensations   Swallow Compensations Pacing;Reduce amounts;Multiple swallow;Alternate viscosity of consistencies;Chin tuck   Swallow Eval: Clinical Impressions   Skilled Criteria for Therapy Intervention Skilled criteria met.  Treatment indicated.   Functional Assessment Scale (FAS) 4   Treatment Diagnosis mild-moderate oral-pharyngeal dysphagia   Diet texture recommendations Nectar thick liquids;Dysphagia diet level 3   Recommended Feeding/Eating Techniques (see below)   Therapy Frequency 5 times/wk   Predicted Duration of Therapy Intervention (days/wks) 1 week   Anticipated Discharge Disposition (to be determined)   Risks and Benefits of Treatment have been explained. Yes   Patient, family and/or staff in agreement with Plan of Care Yes   Clinical Impression Comments Patient presents with mild-moderate oral-pharyngeal dysphagia at bedside.  Deficits/risk factors include hx of dysphagia and pneumonia, new dx of pneumonia, delayed swallow reflex with need for multiple swallows, recent increased difficulty with large medications, increased coughing at home, and intermittent wet voice with thin liquids at bedside despite use of a chin tuck.  Patient tolerated nectar thick liquids by spoon, pudding, and soft solids without signs of aspiration during the evaluation.  Patient's swallow function appears to be below baseline compared to video swallow study report in 2/2017.  Suspect possible aspiration of thin liquids at home despite strategies.  Recommend a dysphagia diet level 3 and nectar thick liquids with precautions including: sit up at 90 degrees, small bites/sips, liquids by spoon, double hard swallows, alternate textures, slow rate, crush large meds/give meds with puree, hold po if aspiration signs are observed.  Swallow Tx with education was provided.  Plan to continue Tx to insure diet tolerance, train strategies, complete exercises, and trial thin liquids for  possible liquid advancement as indicated.  A repeat video swallow study (IP vs OP) may be indicated to fully assess aspiration risk with thin liquids if continued issues are observed.  Will update recommendations as indicated.   Total Evaluation Time   Total Evaluation Time (Minutes) 20

## 2017-09-12 NOTE — PHARMACY-ADMISSION MEDICATION HISTORY
Admission medication history interview status for the 9/12/2017  admission is complete. See EPIC admission navigator for prior to admission medications     Medication history source reliability:Good    Actions taken by pharmacist (provider contacted, etc):None     Additional medication history information not noted on PTA med list :    Patient's daughters help her with her meds. She has a paper med list in her wallet.     She started a 10 day course of Agumentin on 9/7/17. At the time of admission, she has 5 doses left.     Medication reconciliation/reorder completed by provider prior to medication history? No    Time spent in this activity: 15 mins    Prior to Admission medications    Medication Sig Last Dose Taking? Auth Provider   multivitamin  with lutein (OCUVITE WITH LTEIN) CAPS per capsule Take 1 capsule by mouth daily Focus Select brand 9/11/2017 at 1200 Yes Unknown, Entered By History   metFORMIN (GLUCOPHAGE) 500 MG tablet Take 1 tablet (500 mg) by mouth 2 times daily (with meals)  Patient taking differently: Take 500 mg by mouth daily (with dinner)  9/11/2017 at pm Yes Александр Lopez MD   gabapentin (NEURONTIN) 300 MG capsule Take 1 capsule (300 mg) by mouth At Bedtime 9/11/2017 at hs Yes Александр Lopez MD   digoxin (LANOXIN) 125 MCG tablet Take 1 tablet (125 mcg) by mouth daily 9/12/2017 at am Yes Александр Lopez MD   diltiazem (DILACOR XR) 240 MG 24 hr capsule Take 1 capsule (240 mg) by mouth daily 9/12/2017 at am Yes Александр Lopez MD   amoxicillin-clavulanate (AUGMENTIN) 875-125 MG per tablet Take 1 tablet by mouth daily 9/11/2017 at am. has 5 doses left as if 9/12 Yes Александр Lopez MD   terconazole (TERAZOL 7) 0.4 % cream Place 1 applicator vaginally At Bedtime  Patient taking differently: Place 1 applicator vaginally nightly as needed (Vaginal yeast infection)  Past Week at . Applied recently because she had some cream left from a previous use. Yeast infection came back.   Yes Agata Moon PA-C   potassium chloride SA (K-DUR/KLOR-CON M) 10 MEQ CR tablet Take 1 tablet (10 mEq) by mouth daily On days that furosemide is taken Past Month at prn Yes Александр Lopez MD   traMADol (ULTRAM) 50 MG tablet Take 1 tablet (50 mg) by mouth every 6 hours as needed for moderate pain Past Week at prn Yes Александр Lopez MD   furosemide (LASIX) 20 MG tablet Take 1 tablet (20 mg) by mouth daily as needed Past Month at prn Yes Александр Lopez MD   rivaroxaban ANTICOAGULANT (XARELTO) 15 MG TABS tablet Take 1 tablet (15 mg) by mouth daily (with dinner) 9/11/2017 at pm Yes Tan Acosta MD   ascorbic acid (VITAMIN C) 500 MG tablet Take 1 tablet (500 mg) by mouth daily 9/11/2017 at 1200 Yes Александр Lopez MD   Multiple Vitamins-Minerals (CENTRUM SILVER) per tablet Take 1 tablet by mouth daily. 9/11/2017 at 1200 Yes Reported, Patient   calcium carbonate-vitamin D (CALCIUM + D) 600-200 MG-UNIT TABS Take 1 tablet by mouth daily. 9/11/2017 at 1200 Yes Unknown, Entered By History     Kristina Alba

## 2017-09-12 NOTE — IP AVS SNAPSHOT
MRN:[id number]                      After Visit Summary   9/12/2017    Trina Azevedo    MRN: [id number]           Thank you!     Thank you for choosing [facility] for your care. Our goal is always to provide you with excellent care. Hearing back from our patients is one way we can continue to improve our services. Please take a few minutes to complete the written survey that you may receive in the mail after you visit with us. Thank you!        Patient Information     Date Of Birth          3/2/1923        Designated Caregiver       Most Recent Value    Caregiver    Will someone help with your care after discharge? yes    Name of designated caregiver Anabella azevedo    Phone number of caregiver [phone]    Caregiver address [address] [place] [address]      About your hospital stay     You were admitted on:  September 12, 2017 You last received care in the:  [facility] [facility]    You were discharged on:  September 16, 2017        Reason for your hospital stay       You were hospitalized secondary to pneumonia as well as concern for a bleed from your stomach/ intestines                  Who to Call     For medical emergencies, please call 911.  For non-urgent questions about your medical care, please call your primary care provider or clinic, [phone]          Attending Provider     Provider Specialty    Walter Shaikh MD Emergency Medicine    Daiana Pond MD Internal Medicine       Primary Care Provider Office Phone # Fax #    Александр Lopez [phone]       When to contact your care team       Call your primary doctor if you have any of the following: increased pain.                  After Care Instructions     Activity       Your activity upon discharge: activity as tolerated            Diet       Regular diet                  Follow-up Appointments     Follow-up and recommended labs and tests        Follow up with  "hospice as directed by them                  Your next 10 appointments already scheduled     Sep 22, 2017  3:00 PM CDT   Office Visit with Александр Lopez MD   [facility]    [address] [id number] [phone]           Bring a current list of meds and any records pertaining to this visit. For Physicals, please bring immunization records and any forms needing to be filled out. Please arrive 10 minutes early to complete paperwork.            Sep 29, 2017  2:30 PM CDT   SHORT with Александр Lopez MD   [facility]    [address] [id number] [phone]              Pending Results     Date and Time Order Name Status Description    9/12/2017 1223 Blood culture Preliminary     9/12/2017 1223 Blood culture Preliminary             Statement of Approval     Ordered          09/16/17 1108  I have reviewed and agree with all the recommendations and orders detailed in this document.  EFFECTIVE NOW     Approved and electronically signed by:  Roman Jean Baptiste MD             Admission Information     Date & Time Provider Department Dept. Phone    9/12/2017 Daiana Pond MD [facility] [address] [facility] [phone]      Your Vitals Were     Blood Pressure Pulse Temperature Respirations Height Weight    169/71 (BP Location: Left arm) 71 98  F (36.7  C) (Oral) 18 1.524 m (5') 75.4 kg (166 lb 3.6 oz)    Pulse Oximetry BMI (Body Mass Index)                92% 32.46 kg/m2          [employer] Information     [employer] lets you send messages to your doctor, view your test results, renew your prescriptions, schedule appointments and more. To sign up, go to [email or web address] . Click on \"Log in\" on the left side of the screen, which will take you to the Welcome page. Then click on \"Sign up Now\" on the right side of the " page.     You will be asked to enter the access code listed below, as well as some personal information. Please follow the directions to create your username and password.     Your access code is: [email or web address]  Expires: 10/27/2017  9:56 AM     Your access code will  in 90 days. If you need help or a new code, please call your [facility] clinic or [phone].        Care EveryWhere ID     This is your Care EveryWhere ID. This could be used by other organizations to access your [facility] medical records  [id number]        Equal Access to Services     [facility]: Hadii liliya Sethi, waadolfo dumont, alfonso jones, christian wiggins . So [facility] [phone].    ATENCIÓN: Si habla español, tiene a magdaleno disposición servicios gratuitos de asistencia lingüística. Llame al [phone].    We comply with applicable federal civil rights laws and [place] laws. We do not discriminate on the basis of race, color, national origin, age, disability sex, sexual orientation or gender identity.               Review of your medicines      START taking        Dose / Directions    acetaminophen 650 MG Suppository   Commonly known as:  TYLENOL   Used for:  Hospice care patient        Dose:  650 mg   Place 1 suppository (650 mg) rectally every 4 hours as needed for fever or mild pain (Do not exceed 4000 mg total acetaminophen per day.) Unwrap prior to insertion.   Quantity:  2 suppository   Refills:  11       atropine 1 % ophthalmic solution   Used for:  Hospice care patient        Dose:  2-4 drop   Take 2-4 drops by mouth, place under tongue or place inside cheek every 2 hours as needed for other (terminal respiratory secretions) Not for ophthalmic use.   Quantity:  5 mL   Refills:  11       bisacodyl 10 MG Suppository   Commonly known as:  DULCOLAX   Used for:  Hospice care patient        Unwrap and insert 1 suppository rectally twice daily as needed for constipation.   Quantity:  2  suppository   Refills:  11       haloperidol 0.5 MG tablet   Commonly known as:  HALDOL   Used for:  Hospice care patient        Dose:  0.5-1 mg   Take 1-2 tablets (0.5-1 mg) by mouth, place under tongue or insert rectally every 6 hours as needed for agitation (nausea)   Quantity:  30 tablet   Refills:  11       HYDROmorphone (HIGH CONC) 10 mg/mL Liqd oral   Commonly known as:  DILAUDID   Used for:  Hospice care patient        Dose:  1-2 mg   Take 0.1-0.2 mLs (1-2 mg) by mouth or place under tongue every 2 hours as needed for moderate to severe pain (and/or??shortness of breath)   Quantity:  30 mL   Refills:  0       LORazepam 0.5 MG tablet   Commonly known as:  ATIVAN   Used for:  Hospice care patient        Dose:  0.25-0.5 mg   Take 0.5-1 tablets (0.25-0.5 mg) by mouth, place under tongue or insert rectally every 4 hours as needed for anxiety (restlessness)   Quantity:  30 tablet   Refills:  5       sennosides 8.6 MG tablet   Commonly known as:  SENOKOT   Used for:  Hospice care patient        Dose:  1-2 tablet   Take 1-2 tablets by mouth 2 times daily For constipation. May take up to 12 tablets per day as needed   Quantity:  100 tablet   Refills:  1         CONTINUE these medicines which have NOT CHANGED        Dose / Directions    digoxin 125 MCG tablet   Commonly known as:  LANOXIN   Used for:  Chronic atrial fibrillation (H)        Dose:  125 mcg   Take 1 tablet (125 mcg) by mouth daily   Quantity:  90 tablet   Refills:  3       diltiazem 240 MG 24 hr capsule   Commonly known as:  DILACOR XR   Used for:  Chronic atrial fibrillation (H)        Dose:  240 mg   Take 1 capsule (240 mg) by mouth daily   Quantity:  90 capsule   Refills:  3       furosemide 20 MG tablet   Commonly known as:  LASIX   Used for:  Edema        Dose:  20 mg   Take 1 tablet (20 mg) by mouth daily as needed   Quantity:  90 tablet   Refills:  1       gabapentin 300 MG capsule   Commonly known as:  NEURONTIN   Used for:  Neuropathy (H)         Dose:  300 mg   Take 1 capsule (300 mg) by mouth At Bedtime   Quantity:  90 capsule   Refills:  3       rivaroxaban ANTICOAGULANT 15 MG Tabs tablet   Commonly known as:  XARELTO   Used for:  Atrial fibrillation (H), 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        Dose:  15 mg   Take 1 tablet (15 mg) by mouth daily (with dinner)   Quantity:  30 tablet   Refills:  5         STOP taking     amoxicillin-clavulanate 875-125 MG per tablet   Commonly known as:  AUGMENTIN           ascorbic acid 500 MG tablet   Commonly known as:  VITAMIN C           calcium + D 600-200 MG-UNIT Tabs   Generic drug:  calcium carbonate-vitamin D           CENTRUM SILVER per tablet           metFORMIN 500 MG tablet   Commonly known as:  GLUCOPHAGE           multivitamin  with lutein Caps per capsule           potassium chloride SA 10 MEQ CR tablet   Commonly known as:  K-DUR/KLOR-CON M           terconazole 0.4 % cream   Commonly known as:  TERAZOL 7           traMADol 50 MG tablet   Commonly known as:  ULTRAM                Where to get your medicines      These medications were sent to [facility] [id number] [place] [address] AT [facility] [address] [facility] [address]     Phone:  [phone]     bisacodyl 10 MG Suppository    haloperidol 0.5 MG tablet    sennosides 8.6 MG tablet         Some of these will need a paper prescription and others can be bought over the counter. Ask your nurse if you have questions.     Bring a paper prescription for each of these medications     acetaminophen 650 MG Suppository    atropine 1 % ophthalmic solution    HYDROmorphone (HIGH CONC) 10 mg/mL Liqd oral    LORazepam 0.5 MG tablet                Protect others around you: Learn how to safely use, store and throw away your medicines at www.disposemymeds.org.             Medication List: This is a list of all your medications and when to take them. Check marks below  indicate your daily home schedule. Keep this list as a reference.      Medications           Morning Afternoon Evening Bedtime As Needed    acetaminophen 650 MG Suppository   Commonly known as:  TYLENOL   Place 1 suppository (650 mg) rectally every 4 hours as needed for fever or mild pain (Do not exceed 4000 mg total acetaminophen per day.) Unwrap prior to insertion.                                atropine 1 % ophthalmic solution   Take 2-4 drops by mouth, place under tongue or place inside cheek every 2 hours as needed for other (terminal respiratory secretions) Not for ophthalmic use.                                bisacodyl 10 MG Suppository   Commonly known as:  DULCOLAX   Unwrap and insert 1 suppository rectally twice daily as needed for constipation.                                digoxin 125 MCG tablet   Commonly known as:  LANOXIN   Take 1 tablet (125 mcg) by mouth daily   Last time this was given:  125 mcg on 9/16/2017  8:29 AM                                diltiazem 240 MG 24 hr capsule   Commonly known as:  DILACOR XR   Take 1 capsule (240 mg) by mouth daily   Last time this was given:  240 mg on 9/16/2017  8:29 AM                                furosemide 20 MG tablet   Commonly known as:  LASIX   Take 1 tablet (20 mg) by mouth daily as needed   Last time this was given:  20 mg on 9/14/2017  3:09 PM                                gabapentin 300 MG capsule   Commonly known as:  NEURONTIN   Take 1 capsule (300 mg) by mouth At Bedtime   Last time this was given:  300 mg on 9/15/2017 10:15 PM                                haloperidol 0.5 MG tablet   Commonly known as:  HALDOL   Take 1-2 tablets (0.5-1 mg) by mouth, place under tongue or insert rectally every 6 hours as needed for agitation (nausea)                                HYDROmorphone (HIGH CONC) 10 mg/mL Liqd oral   Commonly known as:  DILAUDID   Take 0.1-0.2 mLs (1-2 mg) by mouth or place under tongue every 2 hours as needed for moderate to  severe pain (and/or??shortness of breath)                                LORazepam 0.5 MG tablet   Commonly known as:  ATIVAN   Take 0.5-1 tablets (0.25-0.5 mg) by mouth, place under tongue or insert rectally every 4 hours as needed for anxiety (restlessness)                                rivaroxaban ANTICOAGULANT 15 MG Tabs tablet   Commonly known as:  XARELTO   Take 1 tablet (15 mg) by mouth daily (with dinner)   Last time this was given:  15 mg on 9/15/2017  5:23 PM                                sennosides 8.6 MG tablet   Commonly known as:  SENOKOT   Take 1-2 tablets by mouth 2 times daily For constipation. May take up to 12 tablets per day as needed

## 2017-09-12 NOTE — IP AVS SNAPSHOT
[facility] [address] [facility]    [address] [place] [address]    Phone:  [phone]                                       After Visit Summary   9/12/2017    Trina Azevedo    MRN: [id number]           After Visit Summary Signature Page     I have received my discharge instructions, and my questions have been answered. I have discussed any challenges I see with this plan with the nurse or doctor.    ..........................................................................................................................................  Patient/Patient Representative Signature      ..........................................................................................................................................  Patient Representative Print Name and Relationship to Patient    ..................................................               ................................................  Date                                            Time    ..........................................................................................................................................  Reviewed by Signature/Title    ...................................................              ..............................................  Date                                                            Time

## 2017-09-12 NOTE — ED NOTES
"[facility]  ED Nurse Handoff Report    ED Chief complaint: Generalized Weakness (Dx a couple days ago with pneumonia. Advised to come to the ER if worse. Per ems Pt o2 was 83% RA. Taking amox-clav 875mg)      ED Diagnosis:   Final diagnoses:   Pneumonia of right lower lobe due to infectious organism (H)   Anemia, unspecified type   Gastrointestinal hemorrhage, unspecified gastrointestinal hemorrhage type   Hypoxia       Code Status: Hospitalist to discuss with pt and family.     Allergies:   Allergies   Allergen Reactions     Ciprofloxacin Itching     Mucinex      Felt jittery-possibly just Mucinex- D due to the pseudoephedrine component     Vicodin [Hydrocodone-Acetaminophen] GI Disturbance     Pt tolerates acetaminophen  Pt states that \"Percodent\" gives her hallucinations       Activity level - Baseline/Home:  Independent    Activity Level - Current:   Stand with Assist     Needed?: No    Isolation: No  Infection: Not Applicable    Bariatric?: No    Vital Signs:   Vitals:    09/12/17 1210 09/12/17 1213 09/12/17 1220 09/12/17 1233   BP:  142/77  142/64   Pulse:  70     Resp:  20     Temp:  98  F (36.7  C) 98  F (36.7  C)    TempSrc:  Oral Oral    SpO2: (!) 88% 97%  93%   Weight:  72.6 kg (160 lb) 72.6 kg (160 lb)    Height:  1.524 m (5') 1.524 m (5')        Cardiac Rhythm: ,        Pain level:      Is this patient confused?: No    Patient Report: Initial Complaint: Pt presents to the ER with c/o sob. Dx a few days ago with pneumonia and was placed on an abx at that time. Pt states she does not feel better and now c/o weakness.   Focused Assessment: Pt A&Ox3. C/o sob and weakness. C/o productive cough with a fever. Guaiac positive. Protonix given IV. Abx started. BLE edema.    Tests Performed:   Results for orders placed or performed during the hospital encounter of 09/12/17 (from the past 24 hour(s))   CBC with platelets differential   Result Value Ref Range    WBC 11.6 (H) 4.0 - 11.0 " 10e9/L    RBC Count 3.42 (L) 3.8 - 5.2 10e12/L    Hemoglobin 8.2 (L) 11.7 - 15.7 g/dL    Hematocrit 26.7 (L) 35.0 - 47.0 %    MCV 78 78 - 100 fl    MCH 24.0 (L) 26.5 - 33.0 pg    MCHC 30.7 (L) 31.5 - 36.5 g/dL    RDW 14.6 10.0 - 15.0 %    Platelet Count 405 150 - 450 10e9/L    Diff Method Automated Method     % Neutrophils 75.1 %    % Lymphocytes 12.1 %    % Monocytes 10.7 %    % Eosinophils 1.0 %    % Basophils 0.5 %    % Immature Granulocytes 0.6 %    Nucleated RBCs 0 0 /100    Absolute Neutrophil 8.7 (H) 1.6 - 8.3 10e9/L    Absolute Lymphocytes 1.4 0.8 - 5.3 10e9/L    Absolute Monocytes 1.2 0.0 - 1.3 10e9/L    Absolute Eosinophils 0.1 0.0 - 0.7 10e9/L    Absolute Basophils 0.1 0.0 - 0.2 10e9/L    Abs Immature Granulocytes 0.1 0 - 0.4 10e9/L    Absolute Nucleated RBC 0.0    Comprehensive metabolic panel   Result Value Ref Range    Sodium 133 133 - 144 mmol/L    Potassium 4.3 3.4 - 5.3 mmol/L    Chloride 99 94 - 109 mmol/L    Carbon Dioxide 26 20 - 32 mmol/L    Anion Gap 8 3 - 14 mmol/L    Glucose 327 (H) 70 - 99 mg/dL    Urea Nitrogen 19 7 - 30 mg/dL    Creatinine 1.04 0.52 - 1.04 mg/dL    GFR Estimate 49 (L) >60 mL/min/1.7m2    GFR Estimate If Black 60 (L) >60 mL/min/1.7m2    Calcium 8.6 8.5 - 10.1 mg/dL    Bilirubin Total 0.3 0.2 - 1.3 mg/dL    Albumin 2.8 (L) 3.4 - 5.0 g/dL    Protein Total 7.5 6.8 - 8.8 g/dL    Alkaline Phosphatase 85 40 - 150 U/L    ALT 19 0 - 50 U/L    AST 12 0 - 45 U/L   Troponin I   Result Value Ref Range    Troponin I ES <0.015 0.000 - 0.045 ug/L   INR   Result Value Ref Range    INR 1.37 (H) 0.86 - 1.14   ABO/Rh type and screen   Result Value Ref Range    ABO A     RH(D) Pos     Antibody Screen Neg     Test Valid Only At [facility]        Specimen Expires 09/15/2017    Nt probnp inpatient   Result Value Ref Range    N-Terminal Pro BNP Inpatient 1781 0 - 1800 pg/mL   Digoxin level   Result Value Ref Range    Digoxin Level 1.5 0.5 - 2.0 ug/L   XR Chest 2 Views    Narrative     XR CHEST 2 VW 9/12/2017 1:27 PM    COMPARISON: 9/7/2017    HISTORY: Shortness of breath.      Impression    IMPRESSION: Unchanged small right pleural effusion with associated  atelectasis. Left lung is clear. No pneumothorax on either side.  Enlarged cardiac silhouette is again seen and unchanged.    JENNIFER MORAN MD     Abnormal Results: See above  Treatments provided: Abx     Family Comments: At bedside.     OBS brochure/video discussed/provided to patient: N/A    ED Medications:   Medications   sodium chloride (PF) 0.9% PF flush 3 mL (not administered)   sodium chloride (PF) 0.9% PF flush 3 mL (not administered)   pantoprazole (PROTONIX) 40 mg IV push injection (not administered)   cefTRIAXone (ROCEPHIN) 2 g vial to attach to  ml bag for ADULTS or NS 50 ml bag for PEDS (not administered)   azithromycin (ZITHROMAX) 500 mg in NaCl 0.9 % 250 mL intermittent infusion (not administered)       Drips infusing?:  Yes      ED NURSE PHONE NUMBER: [phone]

## 2017-09-12 NOTE — H&P
[facility]    History and Physical  Hospitalist       Date of Admission:  9/12/2017  Date of Service (when I saw the patient): 9/12/17    Assessment & Plan   Trina Azevedo is a 89 year old female with hx of chronic a-fib on chronic anticoagulation with warfarin, aortic stenosis, HTN, DM2, and prior CVA who presents with progressive shortness of breath and generalized weakness, and is being admitted for acute hypoxic respiratory failure due to pneumonia    Acute hypoxic respiratory failure due to community-acquired bacterial pneumonia  Presented with several week history of productive cough and progressive shortness of breath despite attempt at outpatient management with Augmentin. She was noted to be hypoxic to 83% on room air in the field. Wbc 11.6k on this presentation from 15.5k in clinic on 9/7. CXR on arrival showed small right pleural effusion with associated atelectasis. In the ED, she was initiated on ceftriaxone and azithromycin  - On 3 lpm/NC, wean as tolerated  - Continues on ceftriaxone and azithromycin  - Encourage incentive spirometry    History of dysphagia  Has been maintained on regular diet without overt aspiration, but will consult SLP to evaluate for potential aspiration that may have contributed to pneumonia  - SLP consult pending    Normocytic anemia  Baseline Hgb 10-11. Hgb in clinic on 9/7 8.4 and 8.2. FOBT on arrival positive, but patient denies history of bleeding. Decrease in Hgb possibly nutritional due to poor oral intake over the past few weeks.  - Iron studies, B12, folate  - Continue rivaroxaban for now given that patient does not appear to be actively bleeding  - CBC in AM    Chronic atrial fibrillation  Essential hypertension  [PTA: diltiazem  mg daily, digoxin 125 mcg daily, Lasix 20 mg daily, rivaroxaban 15 mg daily]  - Continues on diltiazem, digoxin, and rivaroxaban  - Holding Lasix while on IV fluids    DM2 with peripheral neuropathy  [PTA: metformin 500  mg daily w/supper]  -  on arrival  - Continues on metformin  - High SSI available  - A1c pending  - *Note, patient is resistant, but agreeable to insulin during this hospital stay    Peripheral neuropathy  Chronic and stable on gabapentin    History of CVA  Chronic and stable on rivaroxaban    FEN: mod CHO diet, NS at 75 ml/h  DVT Prophylaxis: Rivaroxaban  Code Status: DNR / DNI    Disposition: Expected discharge once respiratory status improves and on oral antibiotics, ?Ba Pond    Primary Care Physician   Александр Lopez    Chief Complaint   Shortness of breath, generalized weakness    History is obtained from the patient, her daughters, and medical records    History of Present Illness   Trina Azevedo is a 89 year old female with hx of chronic a-fib on chronic anticoagulation with warfarin, aortic stenosis, HTN, DM2, and prior CVA who presents with progressive shortness of breath and generalized weakness. The patient was at her usual state of health until a few weeks ago when she began feeling generally unwell. At some point over the last few weeks, she developed a productive cough and dyspnea on exertion. Due to persistent cough and progressive shortness of breath, she made an appointment with her primary care provider last week. She was diagnosed with pneumonia and discharged from clinic with Augmentin. She has taken Augmentin without much improvement, and is now experiencing chills, fatigue, and generalized weakness, which prompted her daughters to activate EMS. Of note, her daughters report a history of dysphagia, but patient has not seen speech therapy for some time. They remember previous swallow exercises and recommendations, but reports patient is intermittently compliant.  Patient otherwise reports poor appetite, but has otherwise monalisa tolerating PO and denies overt aspiration events. She denies chest pain, dizziness/lightheadedness, or recent falls. She denies nausea. She  denies dysuria, but her daughters report that her urine appears cloudy. She denies history of black/bloody stools    On EMS arrival, she was noted to be hypoxic to 83% on room air. In the ED, CXR showed small right pleural effusion with associated atelectasis. She was initiated on ceftriaxone and azithromycin for presumed pneumonia.    Past Medical History    I have reviewed this patient's medical history and updated the medical record  Past Medical History:   Diagnosis Date     Acute pancreatitis 1986    gallstone induced     Chronic atrial fibrillation (H) 2001    paroxysmal, stress thallium 2005- normal.  Patient refused warfarin, now on Xarelto     Essential hypertension, benign      Peripheral neuropathy (H)      Restless legs syndrome      Rubella without mention of complication      Stroke (H) 12/2010     Type 2 diabetes, HbA1C goal < 8% (H) 2008     Unspecified sinusitis (chronic)      Unspecified venous (peripheral) insufficiency        Past Surgical History   I have reviewed this patient's surgical history and updated the medical record  Past Surgical History:   Procedure Laterality Date     C APPENDECTOMY       C NONSPECIFIC PROCEDURE      tendon transfer surgery ; thumb     C NONSPECIFIC PROCEDURE      iol ou     C TOTAL KNEE ARTHROPLASTY  2003/2005    bilaterally     HC DILATION/CURETTAGE DIAG/THER NON OB  1978    D & C     HC REMOVAL GALLBLADDER       HC REMOVE TONSILS/ADENOIDS,<11 Y/O      T & A <12y.o.       Prior to Admission Medications   Prior to Admission Medications   Prescriptions Last Dose Informant Patient Reported? Taking?   Multiple Vitamins-Minerals (CENTRUM SILVER) per tablet  Self Yes No   Sig: Take 1 tablet by mouth daily.   amoxicillin-clavulanate (AUGMENTIN) 875-125 MG per tablet 9/12/2017 at Unknown time  No Yes   Sig: Take 1 tablet by mouth daily   ascorbic acid (VITAMIN C) 500 MG tablet   Yes No   Sig: Take 1 tablet (500 mg) by mouth daily   blood glucose monitoring (ONE TOUCH  "ULTRA) test strip   No No   Sig: Use to test blood sugars 1-2 times daily or as directed.   calcium carbonate-vitamin D (CALCIUM + D) 600-200 MG-UNIT TABS  Self Yes No   Sig: Take 1 tablet by mouth daily.   cetirizine (ZYRTEC) 10 MG tablet   No No   Sig: Take 1 tablet (10 mg) by mouth every evening   Patient not taking: Reported on 7/29/2017   digoxin (LANOXIN) 125 MCG tablet   No No   Sig: Take 1 tablet (125 mcg) by mouth daily   diltiazem (DILACOR XR) 240 MG 24 hr capsule   No No   Sig: Take 1 capsule (240 mg) by mouth daily   furosemide (LASIX) 20 MG tablet   No No   Sig: Take 1 tablet (20 mg) by mouth daily as needed   gabapentin (NEURONTIN) 300 MG capsule   No No   Sig: Take 1 capsule (300 mg) by mouth At Bedtime   metFORMIN (GLUCOPHAGE) 500 MG tablet   No No   Sig: Take 1 tablet (500 mg) by mouth 2 times daily (with meals)   potassium chloride SA (K-DUR/KLOR-CON M) 10 MEQ CR tablet   No No   Sig: Take 1 tablet (10 mEq) by mouth daily On days that furosemide is taken   rivaroxaban ANTICOAGULANT (XARELTO) 15 MG TABS tablet   No No   Sig: Take 1 tablet (15 mg) by mouth daily (with dinner)   terconazole (TERAZOL 7) 0.4 % cream   No No   Sig: Place 1 applicator vaginally At Bedtime   Patient not taking: Reported on 7/29/2017   traMADol (ULTRAM) 50 MG tablet   No No   Sig: Take 1 tablet (50 mg) by mouth every 6 hours as needed for moderate pain      Facility-Administered Medications: None     Allergies   Allergies   Allergen Reactions     Ciprofloxacin Itching     Mucinex      Felt jittery-possibly just Mucinex- D due to the pseudoephedrine component     Vicodin [Hydrocodone-Acetaminophen] GI Disturbance     Pt tolerates acetaminophen  Pt states that \"Percodent\" gives her hallucinations       Social History   Remote history of smoking. Denies alcohol use. She lives with her daughter and is otherwise independent of cares.    Family History   I have reviewed this patient's family history  Family History   Problem " Relation Age of Onset     Respiratory Mother      D ; age57     DIABETES Mother      DIABETES Sister      HEART DISEASE Sister      murmur     DIABETES Brother      HEART DISEASE Brother      HEART DISEASE Brother      HEART DISEASE Brother      CANCER Sister        Review of Systems   The 10 point Review of Systems is negative other than noted in the HPI    Physical Exam   Temp: 98  F (36.7  C) Temp src: Oral BP: 142/64 Pulse: 70   Resp: 20 SpO2: 93 % O2 Device: Nasal cannula Oxygen Delivery: 3 LPM  Vital Signs with Ranges  Temp:  [98  F (36.7  C)] 98  F (36.7  C)  Pulse:  [70] 70  Resp:  [20] 20  BP: (142)/(64-77) 142/64  SpO2:  [88 %-97 %] 93 %  160 lbs 0 oz    Constitutional: Appears comfortable, NAD  HEENT: NC/AT, sclera white, conjunctiva clear, EOMI, MMM  Respiratory: Breathing non-labored. Lungs CTAB - no wheezes/crackles/rhonchi  Cardiovascular: Heart irregular rhythm, normal rate, 3/6 systolic murmur at RUSB. No pedal edema.   GI: +BS. Abd soft/NT  Skin: Warm and dry. No rash.  Musculoskeletal: Normal muscle bulk and tone  Neurologic: Alert and appropriate. DONAHUE  Psychiatric: Normal affect    Data   Data reviewed today:  I personally reviewed the EKG tracing showing atrial fibrillation and the chest x-ray image(s) showing small right pleural effusion with atelectasis.    Recent Labs  Lab 09/12/17  1219 09/07/17  1535   WBC 11.6* 15.5*   HGB 8.2* 8.4*   MCV 78 83    462*   INR 1.37*  --     134   POTASSIUM 4.3 5.3   CHLORIDE 99 101   CO2 26 23   BUN 19 23   CR 1.04 1.20*   ANIONGAP 8 10   JENNA 8.6 8.6   * 210*   ALBUMIN 2.8*  --    PROTTOTAL 7.5  --    BILITOTAL 0.3  --    ALKPHOS 85  --    ALT 19  --    AST 12  --    TROPI <0.015  --        Recent Results (from the past 24 hour(s))   XR Chest 2 Views    Narrative    XR CHEST 2 VW 9/12/2017 1:27 PM    COMPARISON: 9/7/2017    HISTORY: Shortness of breath.      Impression    IMPRESSION: Unchanged small right pleural effusion with  associated  atelectasis. Left lung is clear. No pneumothorax on either side.  Enlarged cardiac silhouette is again seen and unchanged.    JENNIFER MORAN MD

## 2017-09-12 NOTE — PLAN OF CARE
Problem: Goal Outcome Summary  Goal: Goal Outcome Summary        Discharge Planner SLP   Patient plan for discharge: Did not state  Current status: A bedside swallow evaluation was completed this pm.  Patient presents with mild-moderate oral-pharyngeal dysphagia at bedside.  Deficits/risk factors include hx of dysphagia and pneumonia, new dx of pneumonia, delayed swallow reflex with need for multiple swallows, recent increased difficulty with large medications, increased coughing at home, and intermittent wet voice with thin liquids at bedside despite use of a chin tuck.  Patient tolerated nectar thick liquids by spoon, pudding, and soft solids without signs of aspiration during the evaluation.  Patient's swallow function appears to be below baseline compared to video swallow study report in 2/2017.  Suspect possible aspiration of thin liquids at home despite strategies.  Recommend a dysphagia diet level 3 and nectar thick liquids with precautions including: sit up at 90 degrees, small bites/sips, liquids by spoon, double hard swallows, alternate textures, slow rate, crush large meds/give meds with puree, hold po if aspiration signs are observed.  Swallow Tx with education was provided.  Plan to continue Tx to insure diet tolerance, train strategies, complete exercises, and trial thin liquids for possible liquid advancement as indicated.  A repeat video swallow study (IP vs OP) may be indicated to fully assess aspiration risk with thin liquids if continued issues are observed.  Will update recommendations as indicated.  Barriers to return to prior living situation: To be determined  Recommendations for discharge: To be determined; Short term SLP swallow Tx recommended if swallow Tx goals not met prior to discharge  Rationale for recommendations: Continue swallow Tx to maximize swallow function and safety for a least restrictive diet       Entered by: Abbey Valles 09/12/2017 4:29 PM

## 2017-09-12 NOTE — ED PROVIDER NOTES
History     Chief Complaint:  Generalized Weakness    HPI   Trina Azevedo is a 89 year old female, with a history as noted below and recently diagnosed a few days ago at [facility] with pneumonia and prescribed Augmentin, who presents with her daughters via EMS to the emergency department for evaluation of generalized weakness. The patient reports that she presented to the the clinic because she was not feeling well at the time and had a productive cough with yellow phlegm, was diagnosed with pneumonia and put on Augmentin. She was instructed to present to the emergency room if anything worsened, which is her reason for presenting today. She reports that she woke up this morning with generalized weakness and shortness of breath with exertion. EMS was called and per their report, the patient's oxygen level was 83% on room air. The patient also is noted to have chest pressure, cough, low grade fevers, leg swelling and constipation. The patient denies any dark/tarry or bloody stools.     Allergies:  Ciprofloxacin  Mucinex  Vicodin     Medications:    Augmentin  Glucophage  Neurontin  Lanoxin  Dilacor  Terazol  Zyrtec  Potassium chloride  Tramadol  Lasix  Xarelto  Centrum silver  Vitamin C  Calcium carbonate-vitamin D     Past Medical History:    Acute pancreatitis  Atrial fibrillation  Hypertension  Restless legs syndrome  Rubella without mention of complication  Stroke  Type 2 Diabetes  Unspecified sinusitis (chronic)  Unspecified venous (peripheral) insufficiency    Past Surgical History:    Appendectomy  Tendon transfer surgery  Iol ou  Total knee arthroplasty - bilaterally  D & C  Removal gallbladder  Tonsillectomy and adenoidectomy    Family History:    Respiratory  Diabetes  Heart Murmur  Heart Disease  Cancer    Social History:  The patient was accompanied to the ED by family and EMS.  Smoking Status: Former  Smokeless Tobacco: No  Alcohol Use: No   Marital Status:   [2]     Review of  Systems   Constitutional: Positive for fever.   Respiratory: Positive for cough.    Cardiovascular: Positive for leg swelling.        Chest pressure   Gastrointestinal: Positive for constipation. Negative for blood in stool.   All other systems reviewed and are negative.    Physical Exam   Vitals:  Patient Vitals for the past 24 hrs:   BP Temp Temp src Pulse Resp SpO2 Height Weight   09/12/17 1233 142/64 - - - - 93 % - -   09/12/17 1220 - 98  F (36.7  C) Oral - - - 1.524 m (5') 72.6 kg (160 lb)   09/12/17 1213 142/77 98  F (36.7  C) Oral 70 20 97 % 1.524 m (5') 72.6 kg (160 lb)   09/12/17 1210 - - - - - (!) 88 % - -   09/12/17 1209 142/77 - - - - - - -      Physical Exam  Nursing note and vitals reviewed.  Constitutional:  Oriented to person, place, and time. Cooperative.   HENT:   Nose:    Nose normal.   Mouth/Throat:   Mucous membranes are normal.   Eyes:    Conjunctivae normal and EOM are normal.      Pupils are equal, round, and reactive to light.   Neck:    Trachea normal.   Cardiovascular:  Irregularly irregular rhythm, normal heart sounds and normal pulses. No murmur heard.  Pulmonary/Chest:  Effort normal and breath sounds normal.   Abdominal:   Soft. Normal appearance and bowel sounds are normal.      There is no tenderness.      There is no rebound and no CVA tenderness.   Musculoskeletal:  Bilateral lower extremity edema.  Lymphadenopathy:  No cervical adenopathy.   Neurological:   Alert and oriented to person, place, and time. Normal strength.      No cranial nerve deficit or sensory deficit. GCS eye subscore is 4. GCS verbal subscore is 5. GCS motor subscore is 6.   Skin:    Skin is intact. No rash noted.   Psychiatric:   Normal mood and affect.   Rectal:  Red tinged stool, which is guaiac positive.  Emergency Department Course     ECG:  ECG taken at 1343, ECG read at 1348  Atrial Fibrillation with a slow ventricular response  Anterior infarct, age undetermined  ST & T wave abnormality, consider  inferolateral ischemia  Abnormal ECG  Rate 59 bpm. RI interval *. QRS duration 76. QT/QTc 370/366. P-R-T axes * 37 207.     Imaging:  Radiology findings were communicated with the patient and family who voiced understanding of the findings.  XR Chest:  IMPRESSION: Unchanged small right pleural effusion with associated  atelectasis. Left lung is clear. No pneumothorax on either side.  Enlarged cardiac silhouette is again seen and unchanged.  Reading per radiology.     Laboratory:  Laboratory findings were communicated with the patient and family who voiced understanding of the findings.  CBC: WBC 11.6 (H), HGB 8.2 (L) o/w WNL ()  CMP: Glucose 327 (H), GFR Estimate 49 (L), Albumin 2.8(L) o/w WNL (Creatinine 1.04)  Troponin (Collected 1219): <0.015  Nt probnp inpatient: 1781  INR: 1.37 (H)  Blood Cultures: Pending  Digoxin Level: 1.5  Lactic Acid Whole Blood: Pending    Interventions:  1345 Protonix 40 mg IV   1402 Zithromax 500 mg in NaCl 0.9% 250 mL intermittent infusion IV   Rocephin 2 g vial to attach to  mL bag IV     Emergency Department Course:  Nursing notes and vitals reviewed.  I performed an exam of the patient as documented above.   IV was inserted and blood was drawn for laboratory testing, results above.  The patient was sent for a XR Chest while in the emergency department, results above.      I discussed the treatment plan with the patient. They expressed understanding of this plan and consented to admission. I discussed the patient with Dr. Pond, who will admit the patient to a monitored bed for further evaluation and treatment.     I personally reviewed the laboratory results with the Patient and family and answered all related questions prior to admission.    Impression & Plan      Medical Decision Making:  This patient is a 89 year ol female who came in with worsening shortness of breath. She was hypoxic for EMS anad also for us. She does not appear septic or toxic or in significant  respiratory distress. I was concerned that she may have worsening pneumonia or pleural effusion and therefore I proceeded with a repeat chest x-ray in addition to blood work. She also is anemic today, which is not that much different than the other day, although it was quite a bit lower than it was earlier in the year. Therefore I provided her IV Protonix in case she has gastritis or PUD. I then spoke with Dr. Pond, who will be admitting the patient.     Diagnosis:    ICD-10-CM    1. Pneumonia of right lower lobe due to infectious organism (H) J18.1    2. Anemia, unspecified type D64.9    3. Gastrointestinal hemorrhage, unspecified gastrointestinal hemorrhage type K92.2    4. Hypoxia R09.02         Disposition:   Admission.    Scribe Disclosure:  I, Inga Meeks, am serving as a scribe at 12:36 PM on 9/12/2017 to document services personally performed by Walter Shaikh MD, based on my observations and the provider's statements to me.  9/12/2017    EMERGENCY DEPARTMENT       Walter Shaikh MD  09/12/17 [id number]

## 2017-09-13 NOTE — PLAN OF CARE
Problem: Goal Outcome Summary  Goal: Goal Outcome Summary  PT: Orders received, eval completed, treatment initiated.  Pt admitted with PNA.  Lives in ramb[place] with daughter, x3 steps to enter, mod I with FWW for mobility.  Does not use supplemental O2 at baseline.  Admits x1 fall in last 6 months.     Discharge Planner PT   Patient plan for discharge: Home  Current status: SBA supine > sit with HOB elevated, SBA sit <> Stand with FWW, amb x75' with FWW and SBA; O2 sats >90% on 4L throughout  Barriers to return to prior living situation: stairs to enter  Recommendations for discharge: Home with assist and HHPT (daughter she lives with works during the day, but has other children within blocks that can check in/assist during the day)  Rationale for recommendations: Anticipate pt will demonstrate adequate mobility for safe discharge home with continued HHPT to address strength and activity tolerance       Entered by: Sigrid Agudelo 09/13/2017 3:25 PM

## 2017-09-13 NOTE — PLAN OF CARE
Problem: Goal Outcome Summary  Goal: Goal Outcome Summary  Outcome: No Change  Pt is A&O x4. VSS on 4L NC, baseline is 0. [place], uses aids. Up with SBA, has own walker here. On DD3 nectar, mod carb diet. Carb count. Baseline numbness and tingling in hands and feet. 0200 . IVF running. L leg warm, painful to touch, and edematous. Tramadol given x1 for pain. Nurse will continue to monitor.

## 2017-09-13 NOTE — PLAN OF CARE
Problem: Goal Outcome Summary  Goal: Goal Outcome Summary  Outcome: No Change  Pt is A&Ox4, forgetful at times, [place], VSS on 3L O2, baseline does not use O2 at home. up with SBA with walker. C/o LE and Hand pain, states it's from neuropathy, Declined interventions. LS diminished. BMx1 this shift. No signs of bleeding. IVF infusing @ 75 ml/hr. Good appetite. /148. Speech consulted, advised DD3/nectar diet. D/c pending progress, continue to monitor.

## 2017-09-13 NOTE — PLAN OF CARE
Problem: Goal Outcome Summary  Goal: Goal Outcome Summary     SLP: Pt with other provider at initial attempt this afternoon. Then, pt with substantial amount of visitors present in room. Will continue per POC.

## 2017-09-13 NOTE — PROGRESS NOTES
[facility]    Hospitalist Progress Note    Date of Service (when I saw the patient): 09/13/2017    Assessment & Plan   Trina Azevedo is a 89 year old female with hx of chronic a-fib on chronic anticoagulation with warfarin, aortic stenosis, HTN, DM2, CKD, and prior CVA who presented on 9/12/2017 with progressive shortness of breath and generalized weakness, and was admitted for acute hypoxic respiratory failure due to pneumonia     Acute hypoxic respiratory failure due to aspiration pneumonia  Presented with several week history of productive cough and progressive shortness of breath despite attempt at outpatient management with Augmentin. She was noted to be hypoxic to 83% on room air in the field. Wbc 11.6k on this presentation from 15.5k in clinic on 9/7. CXR on arrival showed small right pleural effusion with associated atelectasis. In the ED, she was initiated on ceftriaxone and azithromycin for community-acquired pneumonia, but history was suggestive of aspiration pneumonia.   - Change ceftriaxone to Unasyn today  - Continues on azithromycin to complete a 5 day course  - See dysphagia below  - On 3 lpm/NC, wean as tolerated  - Encourage pulm toilet: incentive spirometry, ambulate as tolerated     Chronic dysphagia  Has seen speech therapy in the past for dysphagia, but has been maintained on a regular diet with safe swallow strategies. She was evaluated by SLP on admission who recommended DDL3 with nectar thickened liquids  - On DDL3 with nectar-thickened liquids  - SLP following, appreciate assistance     Iron deficiency anemia  Baseline Hgb 10-11 and has trended down since Feb 2017. Hgb in clinic on 9/7 8.4 and 8.2 on this presentation. FOBT on arrival positive, but patient denies history of black/bloody stools. Iron studies consistent with iron deficiency.  - Hgb 7.6 today without s/sx of active bleeding  - Discussed anticoagulation with patient and daughter today: she has opted to  continue rivaroxaban with plan to discontinue if Hgb continues to trend down over the next 24-48 hrs  - Start IV iron 300 mg daily x2 doses  - B12, folate pending  - Repeat Hgb in AM     Chronic atrial fibrillation  Essential hypertension  [PTA: diltiazem  mg daily, digoxin 125 mcg daily, Lasix 20 mg daily, rivaroxaban 15 mg daily]  - Digoxin changed to q48h on admission due to elevated level on admission  - Repeat digoxin level tomorrow  - Continues on PTA diltiazem and rivaroxaban  - Holding Lasix due to decreased oral intake     DM2 with peripheral neuropathy  [PTA: metformin 500 mg daily w/supper (but prescribed as BID)] A1c 9.5  - -212 in last 24 hours  - Continues on metformin daily, will not increase further due to CKD  - High SSI available  - Consider adding oral hypoglycemic at discharge  *Note, patient is resistant, but agreeable to insulin during this hospital stay     CKD stage III  Cr stable within baseline 1-1.2  - Follow BMP    Peripheral neuropathy  Chronic and stable on gabapentin     History of CVA  Chronic and stable on rivaroxaban     FEN: DDL3 with nectar thickened liquids - mod CHO  DVT Prophylaxis: Pneumatic Compression Devices  Code Status: DNR/DNI    Disposition: Expected discharge once O2 requirements improve and Hgb stable, ?Sat. May need TCU    Daiana Pond    Interval History   No events overnight. Shortness of breath and cough improved. Denies pain or nausea. Discussed decrease in Hgb and what to do with rivaroxaban. She wants to continue rivaroxaban if at all possible, but agrees to discontinue if her Hgb continues to drop. Daughter supportive of this plan.  - Start IV iron  - Change ceftriaxone to Unasyn  - IS, ambulate, wean O2     -Data reviewed today: I reviewed all new labs and imaging results over the last 24 hours. I personally reviewed no images or EKG's today.    Physical Exam   Temp: 98.2  F (36.8  C) Temp src: Oral BP: 150/64 Pulse: 77 Heart Rate: 68 Resp:  16 SpO2: 93 % O2 Device: Nasal cannula Oxygen Delivery: 3 LPM  Vitals:    09/12/17 1213 09/12/17 1220 09/13/17 0635   Weight: 72.6 kg (160 lb) 72.6 kg (160 lb) 75.2 kg (165 lb 12.6 oz)     Vital Signs with Ranges  Temp:  [97.7  F (36.5  C)-98.2  F (36.8  C)] 98.2  F (36.8  C)  Pulse:  [61-77] 77  Heart Rate:  [68-72] 68  Resp:  [14-20] 16  BP: (142-178)/(56-77) 150/64  SpO2:  [88 %-97 %] 93 %       Constitutional: Appears comfortable, NAD  Respiratory: Breathing non-labored. Lungs CTAB - no wheezes, crackles, or rhonchi  Cardiovascular: Heart irregular rhythm, 3/6 systolic murmur at LUSB. No pedal edema  GI: +BS, abd soft/NT   Skin/Integumen: No rash  Neuro: Alert and appropriate, DONAHUE  Psych: Normal affect    Medications     - MEDICATION INSTRUCTIONS -         ampicillin-sulbactam (UNASYN) IV  1.5 g Intravenous Q12H     iron sucrose (VENOFER) intermittent infusion  300 mg Intravenous Daily     pantoprazole  40 mg Oral QAM     sodium chloride (PF)  3 mL Intracatheter Q8H     azithromycin  250 mg Oral Daily     insulin aspart  1-10 Units Subcutaneous TID AC     insulin aspart  1-7 Units Subcutaneous At Bedtime     [START ON 9/14/2017] digoxin  125 mcg Oral Every Other Day     diltiazem  240 mg Oral Daily     gabapentin  300 mg Oral At Bedtime     metFORMIN  500 mg Oral Daily with supper     rivaroxaban ANTICOAGULANT  15 mg Oral Daily with supper     Data     Recent Labs  Lab 09/13/17  0915 09/12/17  1219 09/07/17  1535   WBC 11.7* 11.6* 15.5*   HGB 7.6* 8.2* 8.4*   MCV 79 78 83    405 462*   INR  --  1.37*  --     133 134   POTASSIUM 4.1 4.3 5.3   CHLORIDE 103 99 101   CO2 23 26 23   BUN 17 19 23   CR 1.13* 1.04 1.20*   ANIONGAP 9 8 10   JENNA 8.1* 8.6 8.6   * 327* 210*   ALBUMIN  --  2.8*  --    PROTTOTAL  --  7.5  --    BILITOTAL  --  0.3  --    ALKPHOS  --  85  --    ALT  --  19  --    AST  --  12  --    TROPI  --  <0.015  --        Recent Results (from the past 24 hour(s))   XR Chest 2 Views     Narrative    XR CHEST 2 VW 9/12/2017 1:27 PM    COMPARISON: 9/7/2017    HISTORY: Shortness of breath.      Impression    IMPRESSION: Unchanged small right pleural effusion with associated  atelectasis. Left lung is clear. No pneumothorax on either side.  Enlarged cardiac silhouette is again seen and unchanged.    JENNIFER MORAN MD

## 2017-09-13 NOTE — CONSULTS
"BRIEF NUTRITION ASSESSMENT      REASON FOR ASSESSMENT:  Admission screen:  Reduced oral intake over the last month and MD consult: poor nutrition    NUTRITION HISTORY:  Information provided by pt and her daughter, with whom she lives.  Pt eats 3 small meals/day and 2 snacks. She follows a low Na diet and \"watches\" the carbs.  She has a hx of dysphagia after a stroke and was followed by SLP. She eats soft foods and thin liquids with safe swallowing strategies.  Pt has not had a good appetite for the past 2 weeks due to not feeling well.   Daughter bought her Premier Protein supplements but pt didn't like the taste.    CURRENT DIET AND INTAKE:  Diet:  Mod carb, DD3, NT per SLP.   Pt had a good appetite at breakfast, she ate 100% of her omelet, Cream of Wheat, apple juice and milk. She did not care for the tea.    ANTHROPOMETRICS:  Height: 5'  Weight: 75.2 kg  BMI: 32.38 kg/m2  IBW:  45.4 kg +/- 10%  Weight Status: Obesity Grade I BMI 30-34.9  %IBW: 166%  Weight History: Stable  Wt Readings from Last 10 Encounters:   09/13/17 75.2 kg (165 lb 12.6 oz)   09/07/17 76.2 kg (168 lb)   07/29/17 74.4 kg (164 lb)   04/24/17 75.3 kg (166 lb)   04/18/17 75.6 kg (166 lb 9.6 oz)   04/11/17 76.2 kg (168 lb)   02/03/17 81.2 kg (179 lb)   01/16/17 77.6 kg (171 lb)   09/26/16 77.1 kg (170 lb)   09/16/16 76.2 kg (168 lb)       LABS:  Alb 2.8 (L) - Not a reliable indicator of malnutrition, it is a negative acute-phase reactant - it decreases when inflammation is present and rises when it is resolved.      MALNUTRITION:  Patient does not meet two of the following criteria necessary for diagnosing malnutrition: significant weight loss, reduced intake, subcutaneous fat loss, muscle loss or fluid retention    NUTRITION INTERVENTION:  Nutrition Diagnosis:  No nutrition diagnosis at this time.    Implementation:  Nutrition Education:  Per Provider order if indicated    FOLLOW UP/MONITORING:   Will re-evaluate in 7 - 10 days, or sooner, if " re-consulted.    Alexandria Rock RD  Pager [phone] (M-F)            [phone] ([facility] Hol)

## 2017-09-13 NOTE — PLAN OF CARE
Problem: Goal Outcome Summary  Goal: Goal Outcome Summary  Outcome: No Change  A&O X 4.  Up with SBA, walker and gait belt.  Up to chair for meals.  Tolerating meals but appetite is poor.  Tolerating nectar thick water with a spoon.  Occasional productive cough of yellow sputum.  Denies pain.  O2 at 3L/NC.  Received IV iron today. Daughter at bedside.

## 2017-09-13 NOTE — PROGRESS NOTES
09/13/17 1500   Quick Adds   Type of Visit Initial PT Evaluation   Living Environment   Lives With child(gene), adult  (Daughter )   Living Arrangements house   Home Accessibility bed and bath on same level;tub/shower is not walk in   Number of Stairs to Enter Home 3   Number of Stairs Within Home (To basement, however does not utilize )   Transportation Available family or friend will provide   Living Environment Comment Lives with daughter who works during the day.  When her daughter is at work, has other children that can/will check in on her during the day   Self-Care   Usual Activity Tolerance moderate   Current Activity Tolerance fair   Regular Exercise no   Equipment Currently Used at Home shower chair;walker, standard;grab bar  (grab bar by toilet and in shower; Sock aid)   Functional Level Prior   Ambulation 1-->assistive equipment   Transferring 1-->assistive equipment   Toileting 1-->assistive equipment   Bathing 0-->independent  (Recently has more supervision from daughter )   Dressing 1-->assistive equipment  (Assist for socks )   Fall history within last six months yes   Number of times patient has fallen within last six months 1   Which of the above functional risks had a recent onset or change? ambulation;transferring;toileting;bathing;dressing   Prior Functional Level Comment Mod I with FWW, SEC in bathroom as too small to access with FWW   General Information   Onset of Illness/Injury or Date of Surgery - Date 09/12/17   Referring Physician Daiana Pond MD   Patient/Family Goals Statement to get stronger   Pertinent History of Current Problem (include personal factors and/or comorbidities that impact the POC) Pt  is a 89 year old female with hx of chronic a-fib on chronic anticoagulation with warfarin, aortic stenosis, HTN, DM2, and prior CVA who presents with progressive shortness of breath and generalized weakness, and is being admitted for acute hypoxic respiratory failure due to  pneumonia   Precautions/Limitations fall precautions   General Observations on 4L O2, does not use supplemental O2 at baseline   General Info Comments Activity: Up with assist    Cognitive Status Examination   Orientation orientation to person, place and time   Level of Consciousness alert   Follows Commands and Answers Questions 100% of the time   Personal Safety and Judgment intact   Memory intact   Pain Assessment   Patient Currently in Pain No   Integumentary/Edema   Integumentary/Edema Comments +2 pitting edema in BLE, reports she typically has swelling   Posture    Posture Forward head position;Protracted shoulders;Kyphosis   Range of Motion (ROM)   ROM Comment WFL in BLE   Strength   Strength Comments Grossly 4/5 in BLE   Bed Mobility   Bed Mobility Comments SBA supine > sit with HOB elevated   Transfer Skills   Transfer Comments SBA sit <> stand with FWW, cues for hand placement   Gait   Gait Comments SBA/CGA with FWW x70', decreased gait speed, flexed posture   Balance   Balance Comments Good, no overt LOB/lateral path deviation with static/dynamic standing activities   Sensory Examination   Sensory Perception Comments complains of baseline neuropathy in hands/feet   General Therapy Interventions   Planned Therapy Interventions balance training;bed mobility training;gait training;ROM;strengthening;stretching;transfer training;risk factor education;home program guidelines;progressive activity/exercise   Clinical Impression   Criteria for Skilled Therapeutic Intervention yes, treatment indicated   PT Diagnosis decreased functional mobility   Influenced by the following impairments decreased activity tolerance, weakness   Functional limitations due to impairments bed mobility, transfers, ambulation, stair climbing   Clinical Presentation Stable/Uncomplicated   Clinical Presentation Rationale stable clinical picture (pain, vitals)   Clinical Decision Making (Complexity) Low complexity   Therapy Frequency` daily  "  Predicted Duration of Therapy Intervention (days/wks) 5 days   Anticipated Discharge Disposition Home with Assist;Home with Home Therapy   Risk & Benefits of therapy have been explained Yes   Patient, Family & other staff in agreement with plan of care Yes   [facility] TM \"6 Clicks\"   2016, Trustees of [facility], under license to [employer].  All rights reserved.   6 Clicks Short Forms Basic Mobility Inpatient Short Form   [facility]  \"6 Clicks\" V.2 Basic Mobility Inpatient Short Form   1. Turning from your back to your side while in a flat bed without using bedrails? 3 - A Little   2. Moving from lying on your back to sitting on the side of a flat bed without using bedrails? 3 - A Little   3. Moving to and from a bed to a chair (including a wheelchair)? 3 - A Little   4. Standing up from a chair using your arms (e.g., wheelchair, or bedside chair)? 3 - A Little   5. To walk in hospital room? 3 - A Little   6. Climbing 3-5 steps with a railing? 3 - A Little   Basic Mobility Raw Score (Score out of 24.Lower scores equate to lower levels of function) 18   Total Evaluation Time   Total Evaluation Time (Minutes) 10     "

## 2017-09-13 NOTE — PROGRESS NOTES
SPIRITUAL HEALTH SERVICES Progress Note  [facility] 66    D: The patient reported details and events of a life review. In this review, the patient reported details from their health history over the past couple of decades, the need to understand present health circumstances, family history and activities, vision for life through character principal and lessons form vocational life.    I:  provided non judgemental reflective listening.  encouraged the patient in the history of life choices and family. The  offered a prayer and blessing.     A: The patient reported prayer being important. The patient reported having some trouble saying by memory important prayers. The patient talked about intercessory prayers for wisdom to be given to others. The patient reported a system of character qualities. The patient reported being grateful.      P:  has no further plans.     Rafael Gardner  Chaplain Resident

## 2017-09-13 NOTE — PLAN OF CARE
Problem: Goal Outcome Summary  Goal: Goal Outcome Summary  OT: Evaluation and treatment initiated. Pt lives in a ranch style home with her daughter. Prior pt independent in most ADLs including dressing (assist for socks or use of sock aid) and toileting. More recently pt has needed supervision for bathing.   Discharge Planner OT   Patient plan for discharge: Home   Current status: Pt ambulated to the bathroom and transferred to the toilet with minimum assist. Pt completed toileting with CGA and stood to pull underwear up with minimum assist for balance. Pt demonstrated fatigue and SOB during session. Oxygen ranged from 83-86% on RA.   Barriers to return to prior living situation: Decreased endurance for I/ADLs; current level of assist; intermittent assist from family members.   Recommendations for discharge: Pending progress, TCU vs home with assist for I/ADLs and Home OT   Rationale for recommendations: Current level of assist        Entered by: Jah Whitmore 09/13/2017 11:37 AM

## 2017-09-13 NOTE — PROGRESS NOTES
09/13/17 1055   Quick Adds   Type of Visit Initial Occupational Therapy Evaluation   Living Environment   Lives With child(gene), adult  (Daughter )   Living Arrangements house   Home Accessibility bed and bath on same level;tub/shower is not walk in   Number of Stairs to Enter Home 3   Number of Stairs Within Home (To basement, however does not utilize )   Transportation Available family or friend will provide   Living Environment Comment Children available to assist intermittently    Self-Care   Usual Activity Tolerance moderate   Current Activity Tolerance fair   Regular Exercise no   Equipment Currently Used at Home shower chair;walker, standard;grab bar  (grab bar by toilet and in shower; Sock aid)   Functional Level Prior   Ambulation 1-->assistive equipment   Transferring 1-->assistive equipment   Toileting 1-->assistive equipment   Bathing 0-->independent  (Recently has more supervision from daughter )   Dressing 1-->assistive equipment  (Assist for socks )   Fall history within last six months yes   Number of times patient has fallen within last six months 1   Which of the above functional risks had a recent onset or change? ambulation;transferring;toileting;bathing;dressing   General Information   Onset of Illness/Injury or Date of Surgery - Date 09/12/17   Referring Physician Daiana Pond MD   Patient/Family Goals Statement Home   Additional Occupational Profile Info/Pertinent History of Current Problem 89 year old female with hx of chronic a-fib on chronic anticoagulation with warfarin, aortic stenosis, HTN, DM2, CKD, and prior CVA who presented on 9/12/2017 with progressive shortness of breath and generalized weakness, and was admitted for acute hypoxic respiratory failure due to pneumonia   Precautions/Limitations fall precautions   General Observations Hard of hearing, dysphagia per chart    Cognitive Status Examination   Orientation orientation to person, place and time   Level of  Consciousness alert   Visual Perception   Visual Perception Wears glasses   Sensory Examination   Sensory Quick Adds (Neuropathy in bilateral hands and feet )   Pain Assessment   Patient Currently in Pain Yes, see Vital Sign flowsheet   Range of Motion (ROM)   ROM Quick Adds Shoulder, Left;Shoulder, Right   Transfer Skills   Transfer Transfer Safety Analysis Bed/Chair;Transfer Safety Analysis Sit/Stand;Transfer Skill: Stand to Sit   Transfer Skill: Bed to Chair/Chair to Bed   Level of [place]: Bed to Chair minimum assist (75% patients effort)   Transfer Skill: Sit to Stand   Level of [place]: Sit/Stand minimum assist (75% patients effort)   Toilet Transfer   Toilet Transfer Toilet Transfer Skill;Toilet Transfer Safety Analysis   Transfer Skill: Toilet Transfer   Level of [place]: Toilet minimum assist (75% patients effort)   Lower Body Dressing   Level of [place]: Dress Lower Body moderate assist (50% patients effort)   Instrumental Activities of Daily Living (IADL)   Previous Responsibilities housekeeping;medication management   General Therapy Interventions   Planned Therapy Interventions ADL retraining;IADL retraining;strengthening;transfer training   Clinical Impression   Criteria for Skilled Therapeutic Interventions Met yes, treatment indicated   OT Diagnosis Decreased ADLs and IADLs, functional transfers   Influenced by the following impairments limited endurance for I/ADLs   Assessment of Occupational Performance 1-3 Performance Deficits   Identified Performance Deficits Decreased ADLs and IADLs (dressing, bathing,. toileting), functional transfers   Clinical Decision Making (Complexity) Low complexity   Therapy Frequency daily   Predicted Duration of Therapy Intervention (days/wks) 4 days   Anticipated Discharge Disposition Transitional Care Facility;Home with Home Therapy   Risks and Benefits of Treatment have been explained. Yes   Patient, Family & other staff in agreement with plan of  "care Yes   [facility] AM-PAC TM \"6 Clicks\"   2016, Trustees of [facility], under license to [employer].  All rights reserved.   6 Clicks Short Forms Daily Activity Inpatient Short Form   [facility]  \"6 Clicks\" Daily Activity Inpatient Short Form   1. Putting on and taking off regular lower body clothing? 3 - A Little   2. Bathing (including washing, rinsing, drying)? 3 - A Little   3. Toileting, which includes using toilet, bedpan or urinal? 3 - A Little   4. Putting on and taking off regular upper body clothing? 4 - None   5. Taking care of personal grooming such as brushing teeth? 3 - A Little   6. Eating meals? 3 - A Little   Daily Activity Raw Score (Score out of 24.Lower scores equate to lower levels of function) 19   Total Evaluation Time   Total Evaluation Time (Minutes) 8     "

## 2017-09-14 NOTE — PLAN OF CARE
Problem: Goal Outcome Summary  Goal: Goal Outcome Summary  Outcome: No Change  Pt is A&O x 4, VSS on 3LNC.Denied pain. DD level 3 necator thick and tolerating well. Lung sound clear on upper lobes, fine crackles on lower lobes., has infrequent non-productive cough at times. On IV abx. Up and SBA using walker/GB.  at bedtime. Daughters at bedside. Discharge pending in progress. Will continue to monitor

## 2017-09-14 NOTE — PROGRESS NOTES
[facility]    Hospitalist Progress Note    Date of Service (when I saw the patient): 09/14/2017    Assessment & Plan   Trina Azevedo is a 89 year old female with hx of chronic a-fib on chronic anticoagulation with warfarin, aortic stenosis, HTN, DM2, CKD, and prior CVA who presented on 9/12/2017 with progressive shortness of breath and generalized weakness; she was diagnosed with pneumonia and discharged from clinic with Augmentin; she has taken Augmentin without much improvement, and is now experiencing chills, fatigue, and generalized weakness; she was admitted for acute hypoxic respiratory failure due to pneumonia.     Acute hypoxic respiratory failure due to aspiration pneumonia  Presented with several week history of productive cough and progressive shortness of breath despite attempt at outpatient management with Augmentin. She was noted to be hypoxic to 83% on room air in the field. Wbc 11.6k on this presentation from 15.5k in clinic on 9/7. CXR on arrival showed small right pleural effusion with associated atelectasis. In the ED, she was initiated on ceftriaxone and azithromycin for community-acquired pneumonia, but history was suggestive of aspiration pneumonia.   - Change ceftriaxone to Unasyn on 9/13  - Continues on azithromycin to complete a 5 day course  - afebrile, WBCs 11.6--11.7--12.6  - See dysphagia below  - On 3 lpm/NC, wean as tolerated  - Encourage pulm toilet: incentive spirometry, ambulate as tolerated  - PT eval- rec d/c home with assist     Chronic dysphagia  Has seen speech therapy in the past for dysphagia, but has been maintained on a regular diet with safe swallow strategies. She was evaluated by SLP on admission who recommended DDL3 with nectar thickened liquids  - SLP following,- now on DD3 with thin liquids     Iron deficiency anemia  Baseline Hgb 10-11 and has trended down since Feb 2017. Hgb in clinic on 9/7 8.4 and 8.2 on this presentation. FOBT on arrival  positive, but patient denies history of black/bloody stools. Iron studies consistent with iron deficiency.  - Hgb 7.6 on 9/13 without s/sx of active bleeding  - prior hospitalist discussed anticoagulation with patient and daughter: she has opted to continue rivaroxaban with plan to discontinue if Hgb continues to trend down over the next 24-48 hrs  - Started IV iron 300 mg daily x2 doses  - B12, folate pending  - Hb this am stable at 7.6  - had a long discussion again with 2 of her daughters regarding continuing Xarelto; risks and benefits explained again; they are worried of her having a stroke if she stops Xarelto; she does not seem to have active, large GI bleeding but might have slow chronic GI blood loss; told the daughters that having GI evaluation for possible colonoscopy/EGD might be considered although likely GI will defer any invasive procedures at this time as long as she does not have active bleeding and is hemodynamically stable  - started on PPI 40 mg po daily  - she should follow up with PMD within 1 week to have repeat Hb and ongoing discussion about anticoagulation     Chronic atrial fibrillation  Essential hypertension  [PTA: diltiazem  mg daily, digoxin 125 mcg daily, Lasix 20 mg daily, rivaroxaban 15 mg daily]  - Digoxin changed to q48h on admission due to elevated level on admission  - Repeat digoxin level today- 0.9  - Continues on PTA diltiazem and rivaroxaban     DM2 with peripheral neuropathy  [PTA: metformin 500 mg daily w/supper (but prescribed as BID)] A1c 9.5  - -212 in last 24 hours  - Continues on metformin daily, will not increase further due to CKD  - High SSI available  - BS while here 132--153  - Consider adding oral hypoglycemic at discharge  *Note, patient is resistant, but agreeable to insulin during this hospital stay     CKD stage III  Cr stable within baseline 1-1.2  - Follow BMP    Peripheral neuropathy  Chronic and stable on gabapentin     History of  CVA  Chronic and stable on rivaroxaban    Chronic LE swelling  - on lasix 20 mg po prn at home  - her legs are slightly more swollen, mild crackles at bases- will give 1 dose Lasix 20 mg po today       FEN: DDL3 with thin liquids - mod CHO  DVT Prophylaxis: Pneumatic Compression Devices  Code Status: DNR/DNI     Disposition: Possible d/c home with assist tomorrow; may still need O2 supplementation at the time of the d/c- daughters are OK with this plan    I spent 35 minutes taking care of Mrs Azevedo with more than 50% of time spent counseling the patient and daughters.      Paz Holt MD    Interval History   Reports feeling better, still SOB, more with ambulation, still on O2; denies chest pain, no N/V; denies black or bloody stools; discussed with 2 daughters, bedside RN and CC    -Data reviewed today: I reviewed all new labs and imaging results over the last 24 hours. I personally reviewed no images or EKG's today.    Physical Exam   Temp: 98.1  F (36.7  C) Temp src: Oral BP: 137/58 Pulse: 60 Heart Rate: 77 Resp: 16 SpO2: 93 % O2 Device: Nasal cannula Oxygen Delivery: 3 LPM  Vitals:    09/12/17 1213 09/12/17 1220 09/13/17 0635   Weight: 72.6 kg (160 lb) 72.6 kg (160 lb) 75.2 kg (165 lb 12.6 oz)     Vital Signs with Ranges  Temp:  [97.9  F (36.6  C)-98.2  F (36.8  C)] 98.1  F (36.7  C)  Pulse:  [60-65] 60  Heart Rate:  [55-77] 77  Resp:  [16] 16  BP: (137-180)/(46-66) 137/58  SpO2:  [92 %-95 %] 93 %  I/O last 3 completed shifts:  In: 520 [P.O.:520]  Out: -     Constitutional: Appears comfortable, NAD  Respiratory: Breathing non-labored. Lungs CTAB -mild bibasilar crackles, no wheezes, no rhonchi  Cardiovascular: Heart irregular rhythm, 3/6 systolic murmur at LUSB. 2+ pitting edema  GI: +BS, abd soft/NT   Skin/Integumen: No rash  Neuro: Alert and appropriate, DONAHUE  Psych: Normal affect    Medications     - MEDICATION INSTRUCTIONS -         furosemide  20 mg Oral Once     ampicillin-sulbactam (UNASYN) IV   1.5 g Intravenous Q12H     pantoprazole  40 mg Oral QAM     sodium chloride (PF)  3 mL Intracatheter Q8H     azithromycin  250 mg Oral Daily     insulin aspart  1-10 Units Subcutaneous TID AC     insulin aspart  1-7 Units Subcutaneous At Bedtime     digoxin  125 mcg Oral Every Other Day     diltiazem  240 mg Oral Daily     gabapentin  300 mg Oral At Bedtime     metFORMIN  500 mg Oral Daily with supper     rivaroxaban ANTICOAGULANT  15 mg Oral Daily with supper     Data     Recent Labs  Lab 09/14/17  0825 09/13/17  0915 09/12/17  1219   WBC 12.6* 11.7* 11.6*   HGB 7.6* 7.6* 8.2*   MCV 78 79 78    396 405   INR  --   --  1.37*    135 133   POTASSIUM 4.1 4.1 4.3   CHLORIDE 105 103 99   CO2 23 23 26   BUN 17 17 19   CR 1.08* 1.13* 1.04   ANIONGAP 9 9 8   JENNA 8.4* 8.1* 8.6   * 212* 327*   ALBUMIN  --   --  2.8*   PROTTOTAL  --   --  7.5   BILITOTAL  --   --  0.3   ALKPHOS  --   --  85   ALT  --   --  19   AST  --   --  12   TROPI  --   --  <0.015       No results found for this or any previous visit (from the past 24 hour(s)).

## 2017-09-14 NOTE — PLAN OF CARE
Problem: Goal Outcome Summary  Goal: Goal Outcome Summary  Outcome: No Change  Pt is A&O x4. Up w/SBA. Very [place], even with bilateral hearing aids. VSS on 5L NC this shift. IV saline locked. On DD3 (nectar) mod carb diet.  overnight. Nurse will continue to monitor.

## 2017-09-14 NOTE — PLAN OF CARE
Problem: Goal Outcome Summary  Goal: Goal Outcome Summary  Discharge Planner PT   Patient plan for discharge: Home with A.  Current status: Supervision with transfers and gait using a WW. Ambulated 200' with WW, on 3L via NC-O2 90-91% with activity. Negotiated 3 stairs with B hands on rail, reciprocal up, step to down, CGA for safety. Standing rest post stairs secondary to fatigue. Pt is moving at baseline, with decreased activity tolerance. Pt has met acute PT goals, demonstrates no further need for skilled acute PT at this time. Will benefit from walking with nursing staff and family during LOS to continue to improve airway clearance and general strength. 3-4 walks per day.  Barriers to return to prior living situation: stairs to enter, but demonstrates safety at this time  Recommendations for discharge:  Home with assist and HHPT  (daughter she lives with works during the day, but has other children within blocks that can check in/assist during the day)  Rationale for recommendations: Pt is moving at baseline, limited by activity tolerance only. Supervision provided secondary to O2 line management needs and busy room.     Entered by: Catrina Lala 09/14/2017 2:23 PM    Physical Therapy Discharge Summary     Reason for therapy discharge:    All goals and outcomes met, no further needs identified.  Pt appears to be at baseline with mobility, will benefit from a walking program  short walks 3-4x per day, use of WW.     Progress towards therapy goal(s). See goals on [facility] Plan in [facility] health record for goal details.  Goals met     Therapy recommendation(s):    Continued therapy is recommended.  Rationale/Recommendations:  Pt will benefit from home PT services if she goes home with O2 inorder to ensure optimal safety with management of O2 line in home. .

## 2017-09-14 NOTE — PROGRESS NOTES
"Care Transition Initial Assessment - RN        Met with: Patient and Family, Daughters Cyndi and     DATA   Active Problems:    Community acquired bacterial pneumonia       Cognitive Status: awake, alert and oriented.        Contact information and PCP information verified: Yes    Lives With: Daughter Anabella  Living Arrangements: house         Insurance concerns: No Insurance issues identified    ASSESSMENT  Patient currently receives the following services:  No services     Identified issues/concerns regarding health management: Met with pt with her Daughter Cyndi that is POA for health care.  Pt is very pleasant and is oriented x3.  Pt lives with her Daughter Anabella and three other children live within eight blocks away.  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  Pt's HgbA1C is 9.5.  Discussed getting her diabetes under better control.  Pt is on Metformin.  Cyndi noted she is supposed to take her Metformin twice per day, but only takes this once per day.  Cyndi also noted that her mother is \"done getting poked and at 89 she feels she has done enough of this\".  Pt's spouse that is  was chronically ill on insulin, pt would not consider even a once per day dosing.    For her neuropathy, pt was prescribed Gabapentin tid.  Due to gait instability when takes this medication tid, pt only takes this at bedtime.  Discussed her neuropathy pain and Cyndi noted she cannot take Tylenol due to interaction with Xarelto.  Felt pt and her daughter were thinking of ASA or the NSAIDs, but Cyndi corrected and noted it was Tylenol.  Discussed with PharmD, as writer has never heard of an interaction.  PharmD was unable to find any research that tylenol interacted with Xarelto.  Will discuss with Dr Holt.  PLAN  Financial costs for the patient include NA.  Patient given options and choices for discharge they want [facility]  Patient/family is agreeable " to the plan?  Yes: Home with home careRN,PT/OT  Patient anticipates discharging to home        Patient anticipates needs for home equipment: Does not know, may need O2  Discharge Planner   Discharge Plans in progress:yes  Plan/Disposition:[facility] RN/PT/OT  Appointments: Will schedule follow-up with Dr Lopez prior to discharge for 9/22    Care  (CTS) will continue to follow as needed.

## 2017-09-14 NOTE — PLAN OF CARE
Problem: Goal Outcome Summary  Goal: Goal Outcome Summary  Outcome: Therapy, progress toward functional goals as expected  Discharge Planner OT   Patient plan for discharge: home   Current status: Pt independent wt walking wt WW into bathroom and complete toilet transfers, and hygiene.  Completed ADL's at sink and  uptain clothing from closet able to complete  upper and lower body dressing with out assist. Pt had good tolerance and no symptoms.    Barriers to return to prior living situation: none   Recommendations for discharge:  Home   Rationale for recommendations: . Discussed wt OTR recommend home Pt has good tolerance and endurance for IADL's .       Entered by: Tash Rudolph 09/14/2017 3:10 PM

## 2017-09-14 NOTE — PLAN OF CARE
Problem: Goal Outcome Summary  Goal: Goal Outcome Summary     Discharge Planner SLP   Patient plan for discharge: did not state  Current status: Pt seen for dysphagia tx this AM with family present. Pt had just completed breakfast, stated solids tolerated well with no difficulty. Presented pt with therapeutic trials of nectar thick liquid by spoon/cup and thin liquid by cup with chin tuck (baseline compensatory strategy). Pt with no overt clinical s/s of aspiration. Recommend continue dysphagia diet level 3 (baseline) and advance to thin liquids by single cup with with chin tuck. Pt should be upright/alert.   Barriers to return to prior living situation: none from SLP  Recommendations for discharge: home with assist and  PT/OT  Rationale for recommendations: improvement in swallow function, near baseline.       Entered by: Mari Norton 09/14/2017 8:43 AM

## 2017-09-14 NOTE — TELEPHONE ENCOUNTER
Reason for Call:  Same Day Appointment, Requested Provider:  Michael Lopez MD    PCP: Александр Lopez    Reason for visit: Hospital followup    Duration of symptoms: NA    Additional comments: Please call Melissa at [phone] - This day works best for the patient's daughter.  Could you please work her in that day?    Can we leave a detailed message on this number? YES    Phone number patient can be reached at: Other phone number:  [phone]    Best Time: As soon as possible    Call taken on 9/14/2017 at 1:27 PM by Jane Canchola

## 2017-09-14 NOTE — PLAN OF CARE
Problem: Goal Outcome Summary  Goal: Goal Outcome Summary  Outcome: Improving  A+Ox4, up with SB, GB +walker. Weaned down to 3L O2 from 5L. PT + OT worked with her today. /153. IV Iron sucrose + Unasyn. DD3 diet, thin liquids. Expect DC when O2 requirements improve and hgb stable. VSS, denies pain, will continue to monitor.

## 2017-09-15 NOTE — PLAN OF CARE
"Problem: Goal Outcome Summary  Goal: Goal Outcome Summary  Outcome: No Change  Pt up with SBA and walker to BR.  No c/o pain.  BMx1 this morning.  Voiding well.  LS crackles bases/diminished.  IV lasix 40mg given x1.  Appetite good.  91-93% 2L NC.  oxymask when sleeping as pt is a mouth breather.  Family at bedside supportive.  Walked in hallway x1.   Blood sugars 148, 167.  Iv unasyn/po azithromycin.  Awaiting stool for occult- ordered this afternoon.  hgb 7.3,  Hospice meeting tomorrow morning with pt and family.  Pt wants to go home and \"just be comfortable.\"       "

## 2017-09-15 NOTE — PROGRESS NOTES
"SPIRITUAL HEALTH SERVICES Progress Note  [facility]    D:  visit at family request. Patient reported life review. Patient reported that her children were coming to visit and her decision made with them for Hospice Care. Patient reported heritage and legacy of her children.     I:  provided reflective non judgmental listening.  encouraged patient in her \"hopes.\"  prayed.     A: Patient reported visiting with her parish  and being able to take communion. Patient reported imparting values as an heritage and importance of their own spiritual life. Patient reported recognizing a fortunate life.     P: SH has no further plans.            Rafael Gardner  Chaplain Resident  "

## 2017-09-15 NOTE — PROGRESS NOTES
"Care Coordinator met with pt and family a good amount of time yesterday.  Today, writer went to discuss Tele-Assurance with pt.  After this was discussed her Daughter Cyndi noted that pt wanted to go on Hospice.  Conversed with pt.  She said [facility] is for the \"shi\". Pt worked in [employer] until the age of 83.  She had talked to each of her children and expressed that she does not want any work-up for her anemia.  She does want to remain on Xarelto because she is very fearful of having a stroke.  Pt just had a 90 day supply filled.  This medication would most likely not be covered under Hospice and would be very expensive.  Further discussion with Dr Holt after she assessed pt.  Hospice consult placed.  They wanted to have [facility].  This agency was called and conversed with Qasim.  Hospice meeting tomorrow 9:30am.  There will most likely be at least six of pt's children that will be present.  SW was updated.  "

## 2017-09-15 NOTE — PROGRESS NOTES
SPIRITUAL HEALTH SERVICES Progress Note  [facility]     D:  spoke with family members. Family requested  visit. Family discussed current health and the family relationship and care for the patient.     P: Spritiual Care will follow up with patient.  .         Rafael Gardner  Chaplain Resident

## 2017-09-15 NOTE — PLAN OF CARE
Problem: Goal Outcome Summary  Goal: Goal Outcome Summary     Discharge Planner SLP   Patient plan for discharge: home with assist  Current status: Pt seen for dysphagia tx while upright in chair, daughters present. Family and pt asking appropriate questions re: discharge diet recommendations and how to properly modify textures for safe swallowing at home. Pt is recommended to continue dysphagia diet level 3 textures and thin liquids with use of chin tuck. Pt should sit upright and consume at slow rate.   Barriers to return to prior living situation: none from SLP  Recommendations for discharge: home, refer to OT/PT recs  Rationale for recommendations: baseline swallow function; goals met       Entered by: Mari Norton 09/15/2017 10:11 AM           Speech Language Therapy Discharge Summary    Reason for therapy discharge:    All goals and outcomes met, no further needs identified.    Progress towards therapy goal(s). See goals on Care Plan in [facility] electronic health record for goal details.  Goals met    Therapy recommendation(s):    No further therapy is recommended.

## 2017-09-15 NOTE — PLAN OF CARE
Problem: Goal Outcome Summary  Goal: Goal Outcome Summary  Outcome: No Change  A&O x4.  SBA with GB and walker.  Mod carb DD3 with thins; tolerated well.  , 166.  VSS on 3 LPM via NC.  Unable to titrate down.  Spot checks generally at 90%.  Family helps with frequent toileting post Lasix.  Apical pulse irregular; family reports this is baseline.  Possible discharge to TCU or home tomorrow.

## 2017-09-15 NOTE — PHARMACY
Xarelto - $163    Thank you,  Markus Alba CPhT  [facility] Discharge Pharmacy Liaison  [phone]

## 2017-09-16 NOTE — DISCHARGE SUMMARY
[facility] [place]    Discharge Summary  Hospitalist    Date of Admission:  9/12/2017  Date of Discharge:  9/16/2017  Discharging Provider: Roman Jean Baptiste MD  Date of Service (when I saw the patient): 09/16/17    Discharge Diagnoses   Acute hypoxic respiratory failure due to aspiration pneumonia  Chronic dysphagia  Iron deficiency anemia  Chronic atrial fibrillation  Essential hypertension  DM2 with peripheral neuropathy  Peripheral neuropathy  CKD stage III  Chronic LE edema  H/o CVA    History of Present Illness   Trina Azevedo is a 89 year old female with hx of chronic a-fib on chronic anticoagulation with Xarelto, aortic stenosis, HTN, DM2, CKD, and prior CVA who presented on 9/12/2017 with progressive shortness of breath and generalized weakness.    Hospital Course   Trina Azevedo was admitted on 9/12/2017.  The following problems were addressed during her hospitalization:    Mrs. Azevedo presented as above with concerns for aspiration pneumonia. She was treated for this with IV antibiotics. She also has chronic dysphagia and iron deficiency anemia. She has a suspected GI bleed with slow blood loss. She did not want further evaluation for this. Discussions with family and providers on 9/14 and 9/15; Mrs. Azevedo requested DNR/DNI and comfort cares. She will be continued on diltiazem and digoxin (for rate control of afib as a comfort measure), xarelto (per patient request) and gabapentin (given neuropathy and pain control). She will also take furosemide 20 mg daily as needed at home if she were to develop edema. She will be discharged with hospice in place as well as hospice medications (lorazepam, hydromorphone, atropine, bisacodyl, senna, acetaminophen and haldol). A POLST was completed and signed.     Roman Jean Baptiste M.D.  Hospitalist  Pager [phone]    Significant Results and Procedures   See below    Pending Results   None    Code Status   Comfort Care       Primary Care Physician    Александр Rodriguez Jessica    Physical Exam   Temp: 98  F (36.7  C) Temp src: Oral BP: 169/71   Heart Rate: 78 Resp: 18 SpO2: 92 % O2 Device: Nasal cannula Oxygen Delivery: 3 LPM  Vitals:    09/12/17 1220 09/13/17 0635 09/15/17 0706   Weight: 72.6 kg (160 lb) 75.2 kg (165 lb 12.6 oz) 75.4 kg (166 lb 3.6 oz)     Vital Signs with Ranges  Temp:  [97.3  F (36.3  C)-98  F (36.7  C)] 98  F (36.7  C)  Heart Rate:  [54-78] 78  Resp:  [18-22] 18  BP: (151-169)/(48-71) 169/71  SpO2:  [88 %-93 %] 92 %  I/O last 3 completed shifts:  In: 863 [P.O.:760; I.V.:103]  Out: 1525 [Urine:1525]    Constitutional: Alert, oriented, no acute distress  Respiratory: Lungs clear to auscultation bilaterally, no wheezes, no crackles  Cardiovascular: Regular rate and rhythm, no murmurs  GI: Soft, non-tender, non-disteneded, good bowel sounds  Skin/Integumen: No erythema, cyanosis. Trace LE edema  Other:      Discharge Disposition   Discharged to home  Condition at discharge: Stable    Consultations This Hospital Stay   PHYSICAL THERAPY ADULT IP CONSULT  OCCUPATIONAL THERAPY ADULT IP CONSULT  SWALLOW EVAL SPEECH PATH AT BEDSIDE IP CONSULT  NUTRITION SERVICES ADULT IP CONSULT  SOCIAL WORK IP CONSULT    Time Spent on this Encounter   IRoman, personally saw the patient today and spent greater than 30 minutes discharging this patient.    Discharge Orders     Reason for your hospital stay   You were hospitalized secondary to pneumonia as well as concern for a bleed from your stomach/ intestines     Activity   Your activity upon discharge: activity as tolerated     When to contact your care team   Call your primary doctor if you have any of the following: increased pain.     Follow-up and recommended labs and tests    Follow up with hospice as directed by them     DNR/DNI     Special Code (specify)   Comfort cares     Diet   Regular diet       Discharge Medications   Current Discharge Medication List      START taking these medications     Details   HYDROmorphone, HIGH CONC, (DILAUDID) 10 mg/mL LIQD oral Take 0.1-0.2 mLs (1-2 mg) by mouth or place under tongue every 2 hours as needed for moderate to severe pain (and/or  shortness of breath)  Qty: 30 mL, Refills: 0    Associated Diagnoses: Hospice care patient      LORazepam (ATIVAN) 0.5 MG tablet Take 0.5-1 tablets (0.25-0.5 mg) by mouth, place under tongue or insert rectally every 4 hours as needed for anxiety (restlessness)  Qty: 30 tablet, Refills: 5    Associated Diagnoses: Hospice care patient      atropine 1 % ophthalmic solution Take 2-4 drops by mouth, place under tongue or place inside cheek every 2 hours as needed for other (terminal respiratory secretions) Not for ophthalmic use.  Qty: 5 mL, Refills: 11    Associated Diagnoses: Hospice care patient      acetaminophen (TYLENOL) 650 MG Suppository Place 1 suppository (650 mg) rectally every 4 hours as needed for fever or mild pain (Do not exceed 4000 mg total acetaminophen per day.) Unwrap prior to insertion.  Qty: 2 suppository, Refills: 11    Associated Diagnoses: Hospice care patient      bisacodyl (DULCOLAX) 10 MG Suppository Unwrap and insert 1 suppository rectally twice daily as needed for constipation.  Qty: 2 suppository, Refills: 11    Associated Diagnoses: Hospice care patient      sennosides (SENOKOT) 8.6 MG tablet Take 1-2 tablets by mouth 2 times daily For constipation. May take up to 12 tablets per day as needed  Qty: 100 tablet, Refills: 1    Associated Diagnoses: Hospice care patient      haloperidol (HALDOL) 0.5 MG tablet Take 1-2 tablets (0.5-1 mg) by mouth, place under tongue or insert rectally every 6 hours as needed for agitation (nausea)  Qty: 30 tablet, Refills: 11    Associated Diagnoses: Hospice care patient         CONTINUE these medications which have NOT CHANGED    Details   gabapentin (NEURONTIN) 300 MG capsule Take 1 capsule (300 mg) by mouth At Bedtime  Qty: 90 capsule, Refills: 3    Associated Diagnoses:  "Neuropathy (H)      digoxin (LANOXIN) 125 MCG tablet Take 1 tablet (125 mcg) by mouth daily  Qty: 90 tablet, Refills: 3    Associated Diagnoses: Chronic atrial fibrillation (H)      diltiazem (DILACOR XR) 240 MG 24 hr capsule Take 1 capsule (240 mg) by mouth daily  Qty: 90 capsule, Refills: 3    Associated Diagnoses: Chronic atrial fibrillation (H)      furosemide (LASIX) 20 MG tablet Take 1 tablet (20 mg) by mouth daily as needed  Qty: 90 tablet, Refills: 1    Associated Diagnoses: Edema      rivaroxaban ANTICOAGULANT (XARELTO) 15 MG TABS tablet Take 1 tablet (15 mg) by mouth daily (with dinner)  Qty: 30 tablet, Refills: 5    Associated Diagnoses: Atrial fibrillation (H); 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         STOP taking these medications       multivitamin  with lutein (OCUVITE WITH LTEIN) CAPS per capsule Comments:   Reason for Stopping:         metFORMIN (GLUCOPHAGE) 500 MG tablet Comments:   Reason for Stopping:         amoxicillin-clavulanate (AUGMENTIN) 875-125 MG per tablet Comments:   Reason for Stopping:         terconazole (TERAZOL 7) 0.4 % cream Comments:   Reason for Stopping:         potassium chloride SA (K-DUR/KLOR-CON M) 10 MEQ CR tablet Comments:   Reason for Stopping:         traMADol (ULTRAM) 50 MG tablet Comments:   Reason for Stopping:         ascorbic acid (VITAMIN C) 500 MG tablet Comments:   Reason for Stopping:         Multiple Vitamins-Minerals (CENTRUM SILVER) per tablet Comments:   Reason for Stopping:         calcium carbonate-vitamin D (CALCIUM + D) 600-200 MG-UNIT TABS Comments:   Reason for Stopping:             Allergies   Allergies   Allergen Reactions     Ciprofloxacin Itching     Mucinex      Felt jittery-possibly just Mucinex- D due to the pseudoephedrine component     Vicodin [Hydrocodone-Acetaminophen] GI Disturbance     Pt tolerates acetaminophen  Pt states that \"Percodent\" gives her hallucinations     Data   Most Recent 3 " CBC's:  Recent Labs   Lab Test  09/15/17   0845  09/14/17   0825  09/13/17   0915   WBC  14.9*  12.6*  11.7*   HGB  7.3*  7.6*  7.6*   MCV  79  78  79   PLT  384  385  396      Most Recent 3 BMP's:  Recent Labs   Lab Test  09/15/17   0845  09/14/17   0825  09/13/17   0915   NA  138  137  135   POTASSIUM  4.3  4.1  4.1   CHLORIDE  102  105  103   CO2  26  23  23   BUN  16  17  17   CR  1.16*  1.08*  1.13*   ANIONGAP  10  9  9   JENNA  8.5  8.4*  8.1*   GLC  153*  161*  212*     Most Recent 2 LFT's:  Recent Labs   Lab Test  09/12/17   1219  08/26/16   1457   AST  12  17   ALT  19  27   ALKPHOS  85  81   BILITOTAL  0.3  0.4     Most Recent INR's and Anticoagulation Dosing History:  Anticoagulation Dose History     Recent Dosing and Labs Latest Ref Rng & Units 11/29/2005 11/30/2005 12/1/2005 12/8/2005 12/10/2012 9/12/2016 9/12/2017    INR 0.86 - 1.14 1.14 2.08(H) 2.26(H) 1.87(H) 0.99 1.98(H) 1.37(H)        Most Recent 3 Troponin's:  Recent Labs   Lab Test  09/12/17   1219  12/28/12   1422  12/28/12   0859   TROPI  <0.015  0.016  0.016     Most Recent Cholesterol Panel:  Recent Labs   Lab Test  10/16/15   0749   CHOL  168   LDL  75   HDL  64   TRIG  146     Most Recent 6 Bacteria Isolates From Any Culture (See EPIC Reports for Culture Details):  Recent Labs   Lab Test  09/12/17   1332  09/12/17   1233  09/08/17   0819  04/11/17   1338  02/03/17   1456  09/26/16   0823   CULT  No growth after 4 days  No growth after 4 days  <10,000 colonies/mL  urogenital warren  Susceptibility testing not routinely done    10,000 to 50,000 colonies/mL mixed urogenital warren Susceptibility testing not   routinely done    >100,000 colonies/mL Escherichia coli*  50,000 to 100,000 colonies/mL mixed urogenital warren     Most Recent TSH, T4 and A1c Labs:  Recent Labs   Lab Test  09/12/17   1219   10/16/15   0749   TSH   --    --   2.08   A1C  9.5*   < >  8.5*    < > = values in this interval not displayed.     Results for orders placed or  performed during the hospital encounter of 09/12/17   XR Chest 2 Views    Narrative    XR CHEST 2 VW 9/12/2017 1:27 PM    COMPARISON: 9/7/2017    HISTORY: Shortness of breath.      Impression    IMPRESSION: Unchanged small right pleural effusion with associated  atelectasis. Left lung is clear. No pneumothorax on either side.  Enlarged cardiac silhouette is again seen and unchanged.    JENNIFER MORAN MD

## 2017-09-16 NOTE — PLAN OF CARE
Problem: Goal Outcome Summary  Goal: Goal Outcome Summary  Outcome: Adequate for Discharge Date Met:  09/16/17  Patient alert/orient X4.  Up with sba/belt/walker.  On 3 liters NC, lungs have crackles, CAICEDO.  Discharging to home with hospice today.  Vss, denied any pain.

## 2017-09-16 NOTE — CONSULTS
This writer and TONA Potter met with patient and family members to discuss hospice philosophy, what it covers and does not cover. Patient expressed that she no longer wants to be hospitalized, further interventions and treatments. Patient would like to go home with family providing 24 hour care in her home. Patient will need oxygen delivered to the hospital, this will be delivered at 2pm today. Hospital bed, commode and oxygen will be delivered to the home today. Patient will need comfort medication upon dc, comfort kit orders given to Dr Jean Baptiste who will order and send to [facility] pharmacy. Pharmacy will have medications to the floor by 1pm today. Family is aware of this and in agreement with plan of care. Coordinated cares with TONA WEAVER, and discharging MD. RN to see patient tomorrow in the home to complete admission. Please call hospice with any questions or concerns, thank you for this consult [phone]

## 2017-09-16 NOTE — PLAN OF CARE
Problem: Goal Outcome Summary  Goal: Goal Outcome Summary  Outcome: No Change  A/O x 4; [place].  Tachypnea at times (low 20s) and hypertensive (158/53) otherwise VSS on 2 LPM oxy mask.  LS--FAMILIA/RUL--crackles otherwise diminished x all lobes equally bilateral.  CAICEDO; infrequent nonproductive cough.  +2 LISA.  BG--103.  Voiding characteristics--dribbling.  Assist x 1 with walker/GB.  Possible D/C home today on hospice after meeting.            [id number]:  O2 desating to 88%, bumped to 3 LPM O2 sating low-mid 90s.  Pt. Had hospice meeting this AM and will be D/Cing home on hospice today.  Family at bedside.

## 2017-09-16 NOTE — PLAN OF CARE
Problem: Goal Outcome Summary  Goal: Goal Outcome Summary  Outcome: No Change  A&O x4.  SBA with GB and walker.  DD3 with thins; ate well.  VSS on 2 LPM via NC ex BP of 151/48.  , 228.  Still needs stool specimen for occult blood test; Hbg 7.3 this morning.  Monitoring urine output with IV lasix given this afternoon; appropriate output.  Uses IS with family.  Up in chair for part of shift.  Ambulates to bathroom often.  Has hospice meeting tomorrow morning with plans to discharge home tomorrow with hospice services.

## 2017-09-16 NOTE — CONSULTS
[facility] Consult    Trina Azevedo was admitted to [facility] on 9/12 with resp failure secondary to pneumonia and has elected to go home on comfort cares.  [facility] was consulted by unit care coordinator.    Trina is stable for discharge home today.  Discussed with bedside RN Monie.    Admissions team Abbey CHIRINOS and Melina RODRIGUEZ met with the patient and several family members at [facility] this morning.  The patient was aXoX4 and Family present included adult children Cyndi, Jensen, Bill, Chip, Dustin, Anabella and , daughter in law Julissa and grandson Ashu.      Hospice philosphy and services were discussed in detail, including Medicare benefit and intermittent nature of services.  Patient and family all cohesive around plan of care and family is able to provide 24/7 support in addition to hospice services.      Trina signed her own consents today and initial core team will include RN, SW, HHA 2x/week and SC and 24/7 contact was discussed.  Other services TBD.  PCP is Dr Александр Lopez.    Writer contacted [facility] to deliver O2 to hospital and O2 concentrator and etanks with NC and mask, hospital bed, and commode to the patient's home later today with instruction to coordinate with nando Cazares.  Earliest delivery for hospital O2 is 2pm which is being coordinated with inpatient SW.      A POLST was completed and signed by discharging doctor.  Trina signed her own POLST and it states Comfort Care and DNR.    Trina and family did not have questions at the conclusion of our visit.  Caregiver handbook was provided and 24/7 number to be contacted any time after hospital discharge with questions or concerns.     Coordinated care with unit staff.    CANDIS Valladares  [facility]  [phone]

## 2017-09-16 NOTE — TELEPHONE ENCOUNTER
[facility] nurse, calling to inform patient did sign on with hospice this date; primary is Dr. Lopez.  FNA routed to PCP.

## 2017-09-18 NOTE — TELEPHONE ENCOUNTER
furosemide (LASIX) 20 MG tablet      Last Written Prescription Date: 9/174/15  Last Fill Quantity: 90, # refills: 1  Last Office Visit with G, P or [facility] prescribing provider: 9/7/17  Next 5 appointments (look out 90 days)     Sep 22, 2017  3:00 PM CDT   Office Visit with Александр Lopez MD   [facility]    [address] [place] [id number]   [phone]            Sep 29, 2017  2:30 PM CDT   SHORT with Александр Lopez MD   [facility]    [address] [id number] [phone]                   Potassium   Date Value Ref Range Status   09/15/2017 4.3 3.4 - 5.3 mmol/L Final     Creatinine   Date Value Ref Range Status   09/15/2017 1.16 (H) 0.52 - 1.04 mg/dL Final     BP Readings from Last 3 Encounters:   09/16/17 169/71   09/07/17 132/64   07/29/17 162/63

## 2017-09-18 NOTE — TELEPHONE ENCOUNTER
Medication last filled was 2015. Daughter requesting medication refill because patient is on hospice and they would like to take some fluid off the patient.   Patient has an appointment with PCP coming up next week.  Please advise.  Thank you.

## 2017-09-20 NOTE — TELEPHONE ENCOUNTER
[facility] calling. Pt is new to hospice and is asking if she can discontinue her Lanoxin at this time.  Pt states it is too hard to continue to monitor her pulse and BP.  Would PCP be okay with pt stopping?     Please advise.

## 2017-09-20 NOTE — TELEPHONE ENCOUNTER
Writer updated home care nurse. Home care nurse verbalized understanding and will update patient.

## 2017-09-22 NOTE — TELEPHONE ENCOUNTER
Reason for Call:  Form, our goal is to have forms completed with 72 hours, however, some forms may require a visit or additional information.    Type of letter, form or note:  FMLA    Who is the form from?: Patient's daughter Michelle Kruger (if other please explain)    Where did the form come from: Patient or family brought in       What clinic location was the form placed at?: Internal Medicine    Where the form was placed: Given to TC    What number is listed as a contact on the form?: Give to TC       Additional comments: The patient's mom is in hospice and the FMLA is for them to care for her.     Call taken on 9/22/2017 at 2:40 PM by Jane Canchola

## 2017-09-22 NOTE — TELEPHONE ENCOUNTER
Reason for Call:  Form, our goal is to have forms completed with 72 hours, however, some forms may require a visit or additional information.    Type of letter, form or note:  FMLA    Who is the form from?: Family members - Cyndi Torrez (if other please explain)    Where did the form come from: Patient or family brought in       What clinic location was the form placed at?: Internal Medicine    Where the form was placed: Given to MA/RN    What number is listed as a contact on the form?: Cyndi [phone]       Additional comments: Please call when forms are done    Call taken on 9/22/2017 at 2:25 PM by Jane Canchola

## 2017-11-27 NOTE — PROGRESS NOTES
Outpatient Speech Language Pathology Discharge Note     Patient: Trina Azevedo  : 3/2/1923    Beginning/End Dates of Reporting Period:  17 to 3/31/17    Referring Provider: Dr. Александр Lopez    Therapy Diagnosis: Dysphagia    Client Self Report: Pt did not return for final visits.     Objective Measurements: No final data available.     Goals:  Goal Identifier     Goal Description To increase safety with PO intake, patient will learn and demonstrate use of 2 safe swallow strategies independently across 1 week of meals, per patient/family report.    Target Date 05/10/17   Date Met      Progress: Unmet and discharged.      Goal Identifier     Goal Description Patient will tolerate the least restrictive diet over 1 week of eating with overt s/sx of coughing/choking, per patient/family report.    Target Date 05/10/17   Date Met      Progress: Unmet and discharged.      Progress Toward Goals:    Progress this reporting period: No final data as patient did not return for final visits.     Plan:  Discharge from therapy.    Discharge: Yes.     Reason for Discharge: Medicare G-code: Patient did not attend a final scheduled session prior to discharge. Unable to determine discharge functional status.    Discharge Plan: No further skilled SLP at this time. Pt is welcome to return for further intervention if/when she would like to participate.     Thank you for your referral of this patient.      Carol Rodriguez MA, CCC-SLP  Speech-Language Pathology  [facility]  Phone: [phone]  Pager: [phone]

## 2017-11-30 NOTE — PROGRESS NOTES
[facility]    Hospitalist Progress Note    Date of Service (when I saw the patient): 09/15/2017    Assessment & Plan   Trina Azevedo is a 89 year old female with hx of chronic a-fib on chronic anticoagulation with Xarelto, aortic stenosis, HTN, DM2, CKD, and prior CVA who presented on 9/12/2017 with progressive shortness of breath and generalized weakness; she was diagnosed with pneumonia and discharged from clinic with Augmentin; she has taken Augmentin without much improvement, and is now experiencing chills, fatigue, and generalized weakness; she was admitted for acute hypoxic respiratory failure due to pneumonia; treated with Zithromax/Ceftraixone- initially- switched to Unasyn because of concern for aspiration PNA; she is on Xarelto and noted to have drop in her Hb; it was discussed with her and her family the risks and benefits of continuing Xarelto in the setting of possible GI bleeding; she had a stroke in the past and does not want to have another stroke and wants to continue with Xarelto despite possible GI bleeding; she wants to focus on comfort at this time and wants to proceed with Hospice care.     Acute hypoxic respiratory failure due to aspiration pneumonia  Presented with several week history of productive cough and progressive shortness of breath despite attempt at outpatient management with Augmentin. She was noted to be hypoxic to 83% on room air in the field. Wbc 11.6k on this presentation from 15.5k in clinic on 9/7. CXR on arrival showed small right pleural effusion with associated atelectasis. In the ED, she was initiated on ceftriaxone and azithromycin for community-acquired pneumonia, but history was suggestive of aspiration pneumonia.   - Changed ceftriaxone to Unasyn on 9/13  - Continues on azithromycin to complete a 5 day course  - afebrile but WBCs trending up WBCs 11.6--11.7--12.6---14.9  - See dysphagia below  - On 3 lpm/NC, wean as tolerated  - Encourage pulm  toilet: incentive spirometry, ambulate as tolerated    - she might have a component of fluid overload also, has chronic LE swelling for which she takes Lasix 20 mg po prn at home  - Echo on 2/1017- EF- 65-70%; moderate biatrial enlargement; mild valvular aortic stenosis.  - her legs are more swollen, Lasix 20 mg po X1 given yesterday 9/14 without much improvement  - will give Lasix 40 mg iv X1 now; she agrees to have Lasix iv today, hoping that her breathing will improve and she will feel more comfortable    - would not further escalate the management as she decided to focus on comfort care.  - Hospice consult     Chronic dysphagia  Has seen speech therapy in the past for dysphagia, but has been maintained on a regular diet with safe swallow strategies. She was evaluated by SLP on admission who recommended DDL3 with nectar thickened liquids  - SLP following- now on DD3 with thin liquids     Iron deficiency anemia  Baseline Hgb 10-11 and has trended down since Feb 2017. Hgb in clinic on 9/7 8.4 and 8.2 on this presentation. FOBT on arrival positive, but patient denies history of black/bloody stools.  - Iron studies consistent with iron deficiency.  - Hgb 7.6 on 9/13 without s/sx of active bleeding  - prior hospitalist discussed anticoagulation with patient and daughter: she has opted at that time to continue rivaroxaban with plan to discontinue if Hgb continues to trend down over the next 24-48 hrs  - Started IV iron 300 mg daily x2 doses  - B12- 1077, folate 35.1  - Hb continue to trend down- 7.6--7.3 today  - on 9/14- had a long discussion again with 2 of her daughters regarding continuing Xarelto; risks and benefits explained again; they are worried of her having a stroke if she stops Xarelto; she does not seem to have active, large GI bleeding but might have slow chronic GI blood loss; told the daughters that having GI evaluation for possible colonoscopy/EGD might be considered although likely GI will defer any  invasive procedures at this time as long as she does not have active bleeding and is hemodynamically stable  - started on PPI 40 mg po daily    - today 9/15- had another long discussion with the patient, 2 daughters and son; there are 7 children and it seems that the patient and all her children decided to transition to comfort care- which is reasonable; informed her that Hb continues to trend down 7.3 today; this is likely caused by ongoing GI blood loss; she does not want to stop Xarelto and does not want to have further GI work-up such as EGD/Colonoscopy; she is more worried of having a stroke if not taking Xarelto (had a stroke in the past) than bleeding while on blood thinners. In the past - she told each of her 7 children that she wants to pass peacefully whenever she will get too sick or not have a good quality of life; now she as severe neuropathy and pain in her fingers and cannot paint anymore; she feels her quality of life deteriorated and wants to focus on comfort care; however- she still wants to continue with Xarelto  - her children are all supportive of her wishes  - discussed with CC and Hospice consult placed     Chronic atrial fibrillation  Essential hypertension  [PTA: diltiazem  mg daily, digoxin 125 mcg daily, Lasix 20 mg prn, rivaroxaban 15 mg daily]  - Digoxin changed to q48h on admission due to elevated level on admission  - Repeat digoxin level 0.9  - Continues on PTA diltiazem  - see above- she wants to continue with rivaroxaban despite bleeding risk     DM2 with peripheral neuropathy  [PTA: metformin 500 mg daily w/supper (but prescribed as BID)]   - A1c 9.5  - --167 in last 24 hours  - Continue PTA metformin daily  - High SSI available     CKD stage III  Cr stable within baseline 1-1.2  - cr slightly trending up 1.04--1.13--1.16  - will not monitor renal function since she wants hospice care    Peripheral neuropathy  Chronic and stable on gabapentin     History of CVA  - she  wants to continue rivaroxaban    Chronic LE swelling  - on lasix 20 mg po prn at home  - her legs are slightly more swollen, mild crackles at bases  - got 1 dose Lasix 20 mg po on 9/14- no significant improvement  - will give 1 dose Lasix 40mg iv today     FEN: DDL3 with thin liquids - mod CHO  DVT Prophylaxis: Pneumatic Compression Devices  Code Status: DNR/DNI     Disposition: Wants to be d/c home with hospice-possible d/c tomorrow? After hospice meeting    I spent 40 minutes taking care of Mrs Azevedo with more than 50% of time spent counseling the patient and daughters.      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 I personally reviewed no images or EKG's today.    Physical Exam   Temp: 97.4  F (36.3  C) Temp src: Oral BP: 147/57 Pulse: 71 Heart Rate: 69 Resp: 20 SpO2: 91 % O2 Device: Oxymask Oxygen Delivery: 2 LPM  Vitals:    09/12/17 1220 09/13/17 0635 09/15/17 0706   Weight: 72.6 kg (160 lb) 75.2 kg (165 lb 12.6 oz) 75.4 kg (166 lb 3.6 oz)     Vital Signs with Ranges  Temp:  [97.4  F (36.3  C)-98.1  F (36.7  C)] 97.4  F (36.3  C)  Pulse:  [71-74] 71  Heart Rate:  [62-69] 69  Resp:  [20-28] 20  BP: (142-150)/(53-57) 147/57  SpO2:  [90 %-94 %] 91 %  I/O last 3 completed shifts:  In: 431 [P.O.:120; I.V.:311]  Out: -     Constitutional: Appears comfortable, up in the chair, very pleasant  Respiratory: Breathing non-labored. Lungs CTAB -mild bibasilar  crackles, no wheezes, no rhonchi  Cardiovascular: Heart irregular rhythm, 3/6 systolic murmur at LUSB. 2+ pitting edema  GI: +BS, abd soft/NT   Skin/Integumen: No rash  Neuro: Alert and appropriate, orientedX3  Psych: Normal affect    Medications     - MEDICATION INSTRUCTIONS -         furosemide  40 mg Intravenous Once     ampicillin-sulbactam (UNASYN) IV  1.5 g Intravenous Q12H     pantoprazole  40 mg Oral QAM     sodium chloride (PF)  3 mL Intracatheter Q8H     azithromycin  250 mg Oral Daily     insulin aspart  1-10 Units Subcutaneous TID AC     insulin aspart  1-7 Units Subcutaneous At Bedtime     digoxin  125 mcg Oral Every Other Day     diltiazem  240 mg Oral Daily     gabapentin  300 mg Oral At Bedtime     metFORMIN  500 mg Oral Daily with supper     rivaroxaban ANTICOAGULANT  15 mg Oral Daily with supper     Data     Recent Labs  Lab 09/15/17  0845 09/14/17  0825 09/13/17  0915 09/12/17  1219   WBC 14.9* 12.6* 11.7* 11.6*   HGB 7.3* 7.6* 7.6* 8.2*   MCV 79 78 79 78    385 396 405   INR  --   --   --  1.37*    137 135 133   POTASSIUM 4.3 4.1 4.1 4.3   CHLORIDE 102 105 103 99   CO2 26 23 23 26   BUN 16 17 17 19   CR 1.16* 1.08* 1.13* 1.04   ANIONGAP 10 9 9 8   JENNA 8.5 8.4* 8.1* 8.6   * 161* 212* 327*   ALBUMIN  --   --   --  2.8*   PROTTOTAL  --   --   --  7.5   BILITOTAL  --   --   --  0.3   ALKPHOS  --   --   --  85   ALT  --   --   --  19   AST  --   --   --  12   TROPI  --   --   --  <0.015       No results found for this or any previous visit (from the past 24 hour(s)).       negative...

## 2022-05-16 NOTE — PLAN OF CARE
Problem: Goal Outcome Summary  Goal: Goal Outcome Summary  Outcome: No Change  Pt is A&O x4. VSS on 3L oximask (mouth breather during sleep). Crackles on auscultation. Up w/SBA, walker, gait belt. Very [place] even w/bilateral aids. Adv. DD3 with thins, mod carb diet.  overnight. Ordered LA per sepsis protocol, came back at 1.7. Saline locked. Pt denies any pain. Possible d/c today, nurse will continue to monitor.       No oral lesions; no gross abnormalities

## 2023-11-02 NOTE — PATIENT INSTRUCTIONS
- Start taking Levaquin once daily for 10 days.  (Prescription has been sent to your pharmacy)    - Schedule the cardiac echo before you leave.    - Speech therapy will contact you to schedule your swallow evaluation.  Please contact us if you do not hear from them.     
no